# Patient Record
Sex: FEMALE | Race: WHITE | NOT HISPANIC OR LATINO | URBAN - METROPOLITAN AREA
[De-identification: names, ages, dates, MRNs, and addresses within clinical notes are randomized per-mention and may not be internally consistent; named-entity substitution may affect disease eponyms.]

---

## 2017-03-21 ENCOUNTER — EMERGENCY (EMERGENCY)
Facility: HOSPITAL | Age: 42
LOS: 1 days | Discharge: HOME | End: 2017-03-21
Attending: EMERGENCY MEDICINE | Admitting: EMERGENCY MEDICINE
Payer: COMMERCIAL

## 2017-03-21 VITALS
HEART RATE: 81 BPM | TEMPERATURE: 99 F | RESPIRATION RATE: 18 BRPM | WEIGHT: 231.93 LBS | DIASTOLIC BLOOD PRESSURE: 88 MMHG | OXYGEN SATURATION: 98 % | SYSTOLIC BLOOD PRESSURE: 153 MMHG

## 2017-03-21 DIAGNOSIS — J45.909 UNSPECIFIED ASTHMA, UNCOMPLICATED: ICD-10-CM

## 2017-03-21 DIAGNOSIS — O99.89 OTHER SPECIFIED DISEASES AND CONDITIONS COMPLICATING PREGNANCY, CHILDBIRTH AND THE PUERPERIUM: ICD-10-CM

## 2017-03-21 DIAGNOSIS — I10 ESSENTIAL (PRIMARY) HYPERTENSION: ICD-10-CM

## 2017-03-21 DIAGNOSIS — Z88.1 ALLERGY STATUS TO OTHER ANTIBIOTIC AGENTS STATUS: ICD-10-CM

## 2017-03-21 DIAGNOSIS — Z3A.01 LESS THAN 8 WEEKS GESTATION OF PREGNANCY: ICD-10-CM

## 2017-03-21 DIAGNOSIS — Z88.0 ALLERGY STATUS TO PENICILLIN: ICD-10-CM

## 2017-03-21 DIAGNOSIS — R07.89 OTHER CHEST PAIN: ICD-10-CM

## 2017-03-21 PROCEDURE — 99285 EMERGENCY DEPT VISIT HI MDM: CPT | Mod: 25

## 2017-03-21 PROCEDURE — 83880 ASSAY OF NATRIURETIC PEPTIDE: CPT

## 2017-03-21 PROCEDURE — 80053 COMPREHEN METABOLIC PANEL: CPT

## 2017-03-21 PROCEDURE — 36415 COLL VENOUS BLD VENIPUNCTURE: CPT

## 2017-03-21 PROCEDURE — 93010 ELECTROCARDIOGRAM REPORT: CPT

## 2017-03-21 PROCEDURE — 84484 ASSAY OF TROPONIN QUANT: CPT

## 2017-03-21 PROCEDURE — 93005 ELECTROCARDIOGRAM TRACING: CPT

## 2017-03-21 PROCEDURE — 99283 EMERGENCY DEPT VISIT LOW MDM: CPT | Mod: 25

## 2017-03-21 PROCEDURE — 85025 COMPLETE CBC W/AUTO DIFF WBC: CPT

## 2017-03-21 RX ORDER — ACETAMINOPHEN 500 MG
650 TABLET ORAL ONCE
Qty: 0 | Refills: 0 | Status: COMPLETED | OUTPATIENT
Start: 2017-03-21 | End: 2017-03-21

## 2017-03-21 RX ORDER — SODIUM CHLORIDE 9 MG/ML
3 INJECTION INTRAMUSCULAR; INTRAVENOUS; SUBCUTANEOUS ONCE
Qty: 0 | Refills: 0 | Status: COMPLETED | OUTPATIENT
Start: 2017-03-21 | End: 2017-03-21

## 2017-03-21 RX ADMIN — SODIUM CHLORIDE 3 MILLILITER(S): 9 INJECTION INTRAMUSCULAR; INTRAVENOUS; SUBCUTANEOUS at 20:34

## 2017-03-21 NOTE — ED ADULT TRIAGE NOTE - CHIEF COMPLAINT QUOTE
Patient c/o left chest pain since . Dyspnea on exertion for the past few days. 5-6 weeks pregnant. LMP 2/. . Sent from  to r/o PE.

## 2017-03-21 NOTE — ED PROVIDER NOTE - OBJECTIVE STATEMENT
42 yo woman with asthma and HTN presents with chest pain. This afternoon, while shopping, patient reported onset of 7/10 pressure/pain in lateral left chest/armpit with some mild radiation down the arm. The patient reports having similar pain in the past but never this severe. Usually accompanied by elevated BP and today it was 140/95. So patient went to Urgent care were EKG and HR were unremarkable but BP similar; so Urgent Care sent her to ED. Pt reports needing her rescue inhaler this AM for mild asthma symptoms, which did resolve. When pain started, pt reported some mild light-headedness (now resolved) but denies sweating, palpitations, SOB, nausea/vomiting. Pt reports mild nasal symptoms lately which she attributes to allergies; denies any recent illness. Pain is not changed by change in position. Pt has no additional pain on deep inspiration.

## 2017-03-21 NOTE — ED PROVIDER NOTE - FAMILY HISTORY
Father  Still living? No  Family history of cardiac disorder in father, Age at diagnosis: Age Unknown

## 2017-03-21 NOTE — ED PROVIDER NOTE - ATTENDING CONTRIBUTION TO CARE
41 F- had sharp pain under arm pit/upper outer chest while shopping- has had this pain multiple times in the past - constant pain- but much less  not worse with deep inspiration- no sob  this am- slight wheeze took inhaler- felt better  vss  s1s2 lungs cta bl  abd soft nt nd +bs  ext no c/c/e  IMP- CP  no sob  ekg, labs, IVF

## 2017-03-21 NOTE — ED ADULT NURSE NOTE - OBJECTIVE STATEMENT
pt received in room 7 a & o x 3 pt is about 6 weeks pregnant pt c/o mid sternal pain that started in the afternoon , pt denies any SOB , [pt denies any dizziness , no vaginal bleeding , IV was accessed labs sent will continue to monitor

## 2017-05-15 ENCOUNTER — APPOINTMENT (OUTPATIENT)
Dept: HUMAN REPRODUCTION | Facility: CLINIC | Age: 42
End: 2017-05-15

## 2017-05-15 DIAGNOSIS — N97.9 FEMALE INFERTILITY, UNSPECIFIED: ICD-10-CM

## 2017-06-07 ENCOUNTER — APPOINTMENT (OUTPATIENT)
Dept: HUMAN REPRODUCTION | Facility: CLINIC | Age: 42
End: 2017-06-07

## 2017-06-13 ENCOUNTER — APPOINTMENT (OUTPATIENT)
Dept: OBGYN | Facility: CLINIC | Age: 42
End: 2017-06-13

## 2017-06-18 ENCOUNTER — APPOINTMENT (OUTPATIENT)
Dept: HUMAN REPRODUCTION | Facility: CLINIC | Age: 42
End: 2017-06-18

## 2017-07-03 ENCOUNTER — APPOINTMENT (OUTPATIENT)
Dept: HUMAN REPRODUCTION | Facility: CLINIC | Age: 42
End: 2017-07-03

## 2017-07-06 ENCOUNTER — APPOINTMENT (OUTPATIENT)
Dept: HUMAN REPRODUCTION | Facility: CLINIC | Age: 42
End: 2017-07-06

## 2017-07-10 ENCOUNTER — APPOINTMENT (OUTPATIENT)
Dept: HUMAN REPRODUCTION | Facility: CLINIC | Age: 42
End: 2017-07-10

## 2017-07-12 ENCOUNTER — APPOINTMENT (OUTPATIENT)
Dept: HUMAN REPRODUCTION | Facility: CLINIC | Age: 42
End: 2017-07-12

## 2017-07-14 ENCOUNTER — APPOINTMENT (OUTPATIENT)
Dept: HUMAN REPRODUCTION | Facility: CLINIC | Age: 42
End: 2017-07-14

## 2017-07-16 ENCOUNTER — APPOINTMENT (OUTPATIENT)
Dept: HUMAN REPRODUCTION | Facility: CLINIC | Age: 42
End: 2017-07-16

## 2017-07-17 ENCOUNTER — APPOINTMENT (OUTPATIENT)
Dept: HUMAN REPRODUCTION | Facility: CLINIC | Age: 42
End: 2017-07-17

## 2017-07-18 ENCOUNTER — APPOINTMENT (OUTPATIENT)
Dept: HUMAN REPRODUCTION | Facility: CLINIC | Age: 42
End: 2017-07-18

## 2017-07-19 ENCOUNTER — APPOINTMENT (OUTPATIENT)
Dept: HUMAN REPRODUCTION | Facility: CLINIC | Age: 42
End: 2017-07-19

## 2017-07-20 ENCOUNTER — APPOINTMENT (OUTPATIENT)
Dept: HUMAN REPRODUCTION | Facility: CLINIC | Age: 42
End: 2017-07-20

## 2017-07-21 ENCOUNTER — APPOINTMENT (OUTPATIENT)
Dept: HUMAN REPRODUCTION | Facility: CLINIC | Age: 42
End: 2017-07-21

## 2017-07-25 ENCOUNTER — APPOINTMENT (OUTPATIENT)
Dept: HUMAN REPRODUCTION | Facility: CLINIC | Age: 42
End: 2017-07-25

## 2017-08-02 ENCOUNTER — APPOINTMENT (OUTPATIENT)
Dept: HUMAN REPRODUCTION | Facility: CLINIC | Age: 42
End: 2017-08-02
Payer: COMMERCIAL

## 2017-08-02 PROCEDURE — 76830 TRANSVAGINAL US NON-OB: CPT

## 2017-08-02 PROCEDURE — 36415 COLL VENOUS BLD VENIPUNCTURE: CPT

## 2017-08-07 ENCOUNTER — APPOINTMENT (OUTPATIENT)
Dept: HUMAN REPRODUCTION | Facility: CLINIC | Age: 42
End: 2017-08-07

## 2017-08-14 ENCOUNTER — APPOINTMENT (OUTPATIENT)
Dept: HUMAN REPRODUCTION | Facility: CLINIC | Age: 42
End: 2017-08-14
Payer: COMMERCIAL

## 2017-08-14 PROCEDURE — 99214 OFFICE O/P EST MOD 30 MIN: CPT

## 2017-09-06 ENCOUNTER — APPOINTMENT (OUTPATIENT)
Dept: HUMAN REPRODUCTION | Facility: CLINIC | Age: 42
End: 2017-09-06
Payer: COMMERCIAL

## 2017-09-06 PROCEDURE — 36415 COLL VENOUS BLD VENIPUNCTURE: CPT

## 2017-09-06 PROCEDURE — 99211 OFF/OP EST MAY X REQ PHY/QHP: CPT | Mod: 25

## 2017-09-06 PROCEDURE — 76830 TRANSVAGINAL US NON-OB: CPT

## 2017-09-13 ENCOUNTER — APPOINTMENT (OUTPATIENT)
Dept: HUMAN REPRODUCTION | Facility: CLINIC | Age: 42
End: 2017-09-13

## 2017-09-14 ENCOUNTER — APPOINTMENT (OUTPATIENT)
Dept: HUMAN REPRODUCTION | Facility: CLINIC | Age: 42
End: 2017-09-14
Payer: COMMERCIAL

## 2017-09-14 PROCEDURE — 76830 TRANSVAGINAL US NON-OB: CPT

## 2017-09-18 ENCOUNTER — APPOINTMENT (OUTPATIENT)
Dept: HUMAN REPRODUCTION | Facility: CLINIC | Age: 42
End: 2017-09-18
Payer: COMMERCIAL

## 2017-09-18 PROCEDURE — 36415 COLL VENOUS BLD VENIPUNCTURE: CPT

## 2017-09-18 PROCEDURE — 76830 TRANSVAGINAL US NON-OB: CPT

## 2017-09-20 ENCOUNTER — APPOINTMENT (OUTPATIENT)
Dept: HUMAN REPRODUCTION | Facility: CLINIC | Age: 42
End: 2017-09-20
Payer: COMMERCIAL

## 2017-09-20 PROCEDURE — 36415 COLL VENOUS BLD VENIPUNCTURE: CPT

## 2017-09-20 PROCEDURE — 76830 TRANSVAGINAL US NON-OB: CPT

## 2017-09-21 ENCOUNTER — APPOINTMENT (OUTPATIENT)
Dept: HUMAN REPRODUCTION | Facility: CLINIC | Age: 42
End: 2017-09-21

## 2017-09-22 ENCOUNTER — APPOINTMENT (OUTPATIENT)
Dept: HUMAN REPRODUCTION | Facility: CLINIC | Age: 42
End: 2017-09-22
Payer: COMMERCIAL

## 2017-09-22 PROCEDURE — 76830 TRANSVAGINAL US NON-OB: CPT

## 2017-09-22 PROCEDURE — 36415 COLL VENOUS BLD VENIPUNCTURE: CPT

## 2017-09-25 ENCOUNTER — APPOINTMENT (OUTPATIENT)
Dept: HUMAN REPRODUCTION | Facility: CLINIC | Age: 42
End: 2017-09-25
Payer: COMMERCIAL

## 2017-09-25 PROCEDURE — 36415 COLL VENOUS BLD VENIPUNCTURE: CPT

## 2017-09-25 PROCEDURE — 76830 TRANSVAGINAL US NON-OB: CPT

## 2017-09-27 ENCOUNTER — APPOINTMENT (OUTPATIENT)
Dept: HUMAN REPRODUCTION | Facility: CLINIC | Age: 42
End: 2017-09-27
Payer: COMMERCIAL

## 2017-09-27 PROCEDURE — 76830 TRANSVAGINAL US NON-OB: CPT

## 2017-09-27 PROCEDURE — 36415 COLL VENOUS BLD VENIPUNCTURE: CPT

## 2017-09-29 ENCOUNTER — APPOINTMENT (OUTPATIENT)
Dept: HUMAN REPRODUCTION | Facility: CLINIC | Age: 42
End: 2017-09-29
Payer: COMMERCIAL

## 2017-09-29 PROCEDURE — 36415 COLL VENOUS BLD VENIPUNCTURE: CPT

## 2017-09-29 PROCEDURE — 76830 TRANSVAGINAL US NON-OB: CPT

## 2017-09-30 ENCOUNTER — APPOINTMENT (OUTPATIENT)
Dept: HUMAN REPRODUCTION | Facility: CLINIC | Age: 42
End: 2017-09-30
Payer: COMMERCIAL

## 2017-09-30 PROCEDURE — 76830 TRANSVAGINAL US NON-OB: CPT

## 2017-09-30 PROCEDURE — 99213 OFFICE O/P EST LOW 20 MIN: CPT | Mod: 25

## 2017-09-30 PROCEDURE — 36415 COLL VENOUS BLD VENIPUNCTURE: CPT

## 2017-10-01 ENCOUNTER — APPOINTMENT (OUTPATIENT)
Dept: HUMAN REPRODUCTION | Facility: CLINIC | Age: 42
End: 2017-10-01
Payer: COMMERCIAL

## 2017-10-01 PROCEDURE — 36415 COLL VENOUS BLD VENIPUNCTURE: CPT

## 2017-10-01 PROCEDURE — 76830 TRANSVAGINAL US NON-OB: CPT

## 2017-10-02 ENCOUNTER — APPOINTMENT (OUTPATIENT)
Dept: HUMAN REPRODUCTION | Facility: CLINIC | Age: 42
End: 2017-10-02
Payer: COMMERCIAL

## 2017-10-02 PROCEDURE — 76830 TRANSVAGINAL US NON-OB: CPT

## 2017-10-02 PROCEDURE — 36415 COLL VENOUS BLD VENIPUNCTURE: CPT

## 2017-10-03 ENCOUNTER — APPOINTMENT (OUTPATIENT)
Dept: HUMAN REPRODUCTION | Facility: CLINIC | Age: 42
End: 2017-10-03
Payer: COMMERCIAL

## 2017-10-03 PROCEDURE — 76948 ECHO GUIDE OVA ASPIRATION: CPT

## 2017-10-03 PROCEDURE — 89280 ASSIST OOCYTE FERTILIZATION: CPT

## 2017-10-03 PROCEDURE — 58970 RETRIEVAL OF OOCYTE: CPT

## 2017-10-03 PROCEDURE — 89254 OOCYTE IDENTIFICATION: CPT

## 2017-10-03 PROCEDURE — 89250 CULTR OOCYTE/EMBRYO <4 DAYS: CPT

## 2017-10-06 ENCOUNTER — APPOINTMENT (OUTPATIENT)
Dept: HUMAN REPRODUCTION | Facility: CLINIC | Age: 42
End: 2017-10-06
Payer: COMMERCIAL

## 2017-10-06 PROCEDURE — 89253 EMBRYO HATCHING: CPT

## 2017-10-06 PROCEDURE — 76830 TRANSVAGINAL US NON-OB: CPT

## 2017-10-06 PROCEDURE — 36415 COLL VENOUS BLD VENIPUNCTURE: CPT

## 2017-10-07 PROCEDURE — 89272 EXTENDED CULTURE OF OOCYTES: CPT

## 2017-10-09 PROCEDURE — 89258 CRYOPRESERVATION EMBRYO(S): CPT

## 2017-10-09 PROCEDURE — 89342 STORAGE/YEAR EMBRYO(S): CPT

## 2017-10-09 PROCEDURE — 89290 BIOPSY OOCYTE POLAR BODY <=5: CPT

## 2017-10-16 ENCOUNTER — APPOINTMENT (OUTPATIENT)
Dept: HUMAN REPRODUCTION | Facility: CLINIC | Age: 42
End: 2017-10-16

## 2017-10-16 ENCOUNTER — APPOINTMENT (OUTPATIENT)
Dept: HUMAN REPRODUCTION | Facility: CLINIC | Age: 42
End: 2017-10-16
Payer: COMMERCIAL

## 2017-10-16 PROCEDURE — 99213 OFFICE O/P EST LOW 20 MIN: CPT | Mod: 25

## 2017-10-16 PROCEDURE — 36415 COLL VENOUS BLD VENIPUNCTURE: CPT

## 2017-10-16 PROCEDURE — 76830 TRANSVAGINAL US NON-OB: CPT

## 2017-10-19 ENCOUNTER — APPOINTMENT (OUTPATIENT)
Dept: HUMAN REPRODUCTION | Facility: CLINIC | Age: 42
End: 2017-10-19

## 2017-10-30 ENCOUNTER — APPOINTMENT (OUTPATIENT)
Dept: HUMAN REPRODUCTION | Facility: CLINIC | Age: 42
End: 2017-10-30

## 2017-11-30 ENCOUNTER — APPOINTMENT (OUTPATIENT)
Dept: HUMAN REPRODUCTION | Facility: CLINIC | Age: 42
End: 2017-11-30
Payer: COMMERCIAL

## 2017-11-30 PROCEDURE — 99215 OFFICE O/P EST HI 40 MIN: CPT | Mod: 25

## 2017-12-22 ENCOUNTER — APPOINTMENT (OUTPATIENT)
Dept: HUMAN REPRODUCTION | Facility: CLINIC | Age: 42
End: 2017-12-22
Payer: COMMERCIAL

## 2017-12-22 PROCEDURE — 36415 COLL VENOUS BLD VENIPUNCTURE: CPT

## 2017-12-22 PROCEDURE — 76830 TRANSVAGINAL US NON-OB: CPT

## 2017-12-26 ENCOUNTER — APPOINTMENT (OUTPATIENT)
Dept: HUMAN REPRODUCTION | Facility: CLINIC | Age: 42
End: 2017-12-26
Payer: COMMERCIAL

## 2017-12-26 PROCEDURE — 76830 TRANSVAGINAL US NON-OB: CPT

## 2017-12-26 PROCEDURE — 36415 COLL VENOUS BLD VENIPUNCTURE: CPT

## 2017-12-28 ENCOUNTER — APPOINTMENT (OUTPATIENT)
Dept: HUMAN REPRODUCTION | Facility: CLINIC | Age: 42
End: 2017-12-28
Payer: COMMERCIAL

## 2017-12-29 ENCOUNTER — APPOINTMENT (OUTPATIENT)
Dept: HUMAN REPRODUCTION | Facility: CLINIC | Age: 42
End: 2017-12-29
Payer: COMMERCIAL

## 2017-12-29 ENCOUNTER — APPOINTMENT (OUTPATIENT)
Dept: HUMAN REPRODUCTION | Facility: CLINIC | Age: 42
End: 2017-12-29

## 2017-12-29 PROCEDURE — 36415 COLL VENOUS BLD VENIPUNCTURE: CPT

## 2017-12-29 PROCEDURE — 76830 TRANSVAGINAL US NON-OB: CPT

## 2018-01-02 ENCOUNTER — APPOINTMENT (OUTPATIENT)
Dept: HUMAN REPRODUCTION | Facility: CLINIC | Age: 43
End: 2018-01-02
Payer: COMMERCIAL

## 2018-01-02 PROCEDURE — 76830 TRANSVAGINAL US NON-OB: CPT

## 2018-01-02 PROCEDURE — 36415 COLL VENOUS BLD VENIPUNCTURE: CPT

## 2018-01-05 ENCOUNTER — APPOINTMENT (OUTPATIENT)
Dept: HUMAN REPRODUCTION | Facility: CLINIC | Age: 43
End: 2018-01-05
Payer: COMMERCIAL

## 2018-01-05 PROCEDURE — 76830 TRANSVAGINAL US NON-OB: CPT

## 2018-01-05 PROCEDURE — 36415 COLL VENOUS BLD VENIPUNCTURE: CPT

## 2018-01-08 ENCOUNTER — APPOINTMENT (OUTPATIENT)
Dept: HUMAN REPRODUCTION | Facility: CLINIC | Age: 43
End: 2018-01-08
Payer: COMMERCIAL

## 2018-01-08 PROCEDURE — 36415 COLL VENOUS BLD VENIPUNCTURE: CPT

## 2018-01-08 PROCEDURE — 76830 TRANSVAGINAL US NON-OB: CPT

## 2018-01-11 ENCOUNTER — APPOINTMENT (OUTPATIENT)
Dept: HUMAN REPRODUCTION | Facility: CLINIC | Age: 43
End: 2018-01-11
Payer: COMMERCIAL

## 2018-01-11 PROCEDURE — 76830 TRANSVAGINAL US NON-OB: CPT

## 2018-01-11 PROCEDURE — 36415 COLL VENOUS BLD VENIPUNCTURE: CPT

## 2018-01-12 ENCOUNTER — APPOINTMENT (OUTPATIENT)
Dept: HUMAN REPRODUCTION | Facility: CLINIC | Age: 43
End: 2018-01-12
Payer: COMMERCIAL

## 2018-01-12 PROCEDURE — 76830 TRANSVAGINAL US NON-OB: CPT

## 2018-01-12 PROCEDURE — 36415 COLL VENOUS BLD VENIPUNCTURE: CPT

## 2018-01-13 ENCOUNTER — APPOINTMENT (OUTPATIENT)
Dept: HUMAN REPRODUCTION | Facility: CLINIC | Age: 43
End: 2018-01-13
Payer: COMMERCIAL

## 2018-01-13 PROCEDURE — 76830 TRANSVAGINAL US NON-OB: CPT

## 2018-01-13 PROCEDURE — 99213 OFFICE O/P EST LOW 20 MIN: CPT | Mod: 25

## 2018-01-13 PROCEDURE — 36415 COLL VENOUS BLD VENIPUNCTURE: CPT

## 2018-01-14 ENCOUNTER — APPOINTMENT (OUTPATIENT)
Dept: HUMAN REPRODUCTION | Facility: CLINIC | Age: 43
End: 2018-01-14
Payer: COMMERCIAL

## 2018-01-14 PROCEDURE — 36415 COLL VENOUS BLD VENIPUNCTURE: CPT

## 2018-01-14 PROCEDURE — 76830 TRANSVAGINAL US NON-OB: CPT

## 2018-01-15 ENCOUNTER — APPOINTMENT (OUTPATIENT)
Dept: HUMAN REPRODUCTION | Facility: CLINIC | Age: 43
End: 2018-01-15
Payer: COMMERCIAL

## 2018-01-15 PROCEDURE — 36415 COLL VENOUS BLD VENIPUNCTURE: CPT

## 2018-01-15 PROCEDURE — 99213 OFFICE O/P EST LOW 20 MIN: CPT | Mod: 25

## 2018-01-15 PROCEDURE — 76830 TRANSVAGINAL US NON-OB: CPT

## 2018-01-16 ENCOUNTER — APPOINTMENT (OUTPATIENT)
Dept: HUMAN REPRODUCTION | Facility: CLINIC | Age: 43
End: 2018-01-16
Payer: COMMERCIAL

## 2018-01-16 PROCEDURE — 89250 CULTR OOCYTE/EMBRYO <4 DAYS: CPT

## 2018-01-16 PROCEDURE — 89342 STORAGE/YEAR EMBRYO(S): CPT

## 2018-01-16 PROCEDURE — 89254 OOCYTE IDENTIFICATION: CPT

## 2018-01-16 PROCEDURE — 89280 ASSIST OOCYTE FERTILIZATION: CPT

## 2018-01-16 PROCEDURE — 58970 RETRIEVAL OF OOCYTE: CPT

## 2018-01-16 PROCEDURE — 89290 BIOPSY OOCYTE POLAR BODY <=5: CPT

## 2018-01-16 PROCEDURE — 76948 ECHO GUIDE OVA ASPIRATION: CPT

## 2018-01-19 ENCOUNTER — APPOINTMENT (OUTPATIENT)
Dept: HUMAN REPRODUCTION | Facility: CLINIC | Age: 43
End: 2018-01-19

## 2018-01-19 PROCEDURE — 89253 EMBRYO HATCHING: CPT

## 2018-01-20 PROCEDURE — 89272 EXTENDED CULTURE OF OOCYTES: CPT

## 2018-01-21 ENCOUNTER — APPOINTMENT (OUTPATIENT)
Dept: HUMAN REPRODUCTION | Facility: CLINIC | Age: 43
End: 2018-01-21

## 2018-01-22 PROCEDURE — 89258 CRYOPRESERVATION EMBRYO(S): CPT

## 2018-01-23 ENCOUNTER — APPOINTMENT (OUTPATIENT)
Dept: HUMAN REPRODUCTION | Facility: CLINIC | Age: 43
End: 2018-01-23
Payer: COMMERCIAL

## 2018-01-23 PROCEDURE — 99024 POSTOP FOLLOW-UP VISIT: CPT

## 2018-03-19 ENCOUNTER — APPOINTMENT (OUTPATIENT)
Dept: HUMAN REPRODUCTION | Facility: CLINIC | Age: 43
End: 2018-03-19
Payer: COMMERCIAL

## 2018-03-19 PROCEDURE — 76830 TRANSVAGINAL US NON-OB: CPT

## 2018-03-19 PROCEDURE — 36415 COLL VENOUS BLD VENIPUNCTURE: CPT

## 2018-03-19 PROCEDURE — 99213 OFFICE O/P EST LOW 20 MIN: CPT | Mod: 25

## 2018-04-06 NOTE — ED PROVIDER NOTE - PRIOR EKG STATUS
Abdomen soft, +RLQ tenderness, no rebound or guarding, +bs
there is no prior EKG available for comparison

## 2018-04-09 ENCOUNTER — APPOINTMENT (OUTPATIENT)
Dept: HUMAN REPRODUCTION | Facility: CLINIC | Age: 43
End: 2018-04-09
Payer: COMMERCIAL

## 2018-04-09 PROCEDURE — 36415 COLL VENOUS BLD VENIPUNCTURE: CPT

## 2018-04-09 PROCEDURE — 99213 OFFICE O/P EST LOW 20 MIN: CPT | Mod: 25

## 2018-04-09 PROCEDURE — 76830 TRANSVAGINAL US NON-OB: CPT

## 2018-04-20 ENCOUNTER — APPOINTMENT (OUTPATIENT)
Dept: HUMAN REPRODUCTION | Facility: CLINIC | Age: 43
End: 2018-04-20
Payer: COMMERCIAL

## 2018-04-20 PROCEDURE — 99214 OFFICE O/P EST MOD 30 MIN: CPT | Mod: 25

## 2018-04-20 PROCEDURE — 36415 COLL VENOUS BLD VENIPUNCTURE: CPT

## 2018-04-20 PROCEDURE — 76830 TRANSVAGINAL US NON-OB: CPT

## 2018-04-24 ENCOUNTER — APPOINTMENT (OUTPATIENT)
Dept: HUMAN REPRODUCTION | Facility: CLINIC | Age: 43
End: 2018-04-24
Payer: COMMERCIAL

## 2018-04-24 PROCEDURE — 36415 COLL VENOUS BLD VENIPUNCTURE: CPT

## 2018-04-24 PROCEDURE — 76830 TRANSVAGINAL US NON-OB: CPT

## 2018-04-27 ENCOUNTER — APPOINTMENT (OUTPATIENT)
Dept: HUMAN REPRODUCTION | Facility: CLINIC | Age: 43
End: 2018-04-27
Payer: COMMERCIAL

## 2018-04-27 PROCEDURE — 76830 TRANSVAGINAL US NON-OB: CPT

## 2018-04-27 PROCEDURE — 36415 COLL VENOUS BLD VENIPUNCTURE: CPT

## 2018-04-27 PROCEDURE — 99213 OFFICE O/P EST LOW 20 MIN: CPT | Mod: 25

## 2018-04-30 ENCOUNTER — APPOINTMENT (OUTPATIENT)
Dept: HUMAN REPRODUCTION | Facility: CLINIC | Age: 43
End: 2018-04-30
Payer: COMMERCIAL

## 2018-04-30 PROCEDURE — 36415 COLL VENOUS BLD VENIPUNCTURE: CPT

## 2018-04-30 PROCEDURE — 76830 TRANSVAGINAL US NON-OB: CPT

## 2018-04-30 PROCEDURE — 99212 OFFICE O/P EST SF 10 MIN: CPT | Mod: 25

## 2018-05-02 ENCOUNTER — APPOINTMENT (OUTPATIENT)
Dept: HUMAN REPRODUCTION | Facility: CLINIC | Age: 43
End: 2018-05-02
Payer: COMMERCIAL

## 2018-05-02 PROCEDURE — 36415 COLL VENOUS BLD VENIPUNCTURE: CPT

## 2018-05-02 PROCEDURE — 76830 TRANSVAGINAL US NON-OB: CPT

## 2018-05-03 ENCOUNTER — APPOINTMENT (OUTPATIENT)
Dept: HUMAN REPRODUCTION | Facility: CLINIC | Age: 43
End: 2018-05-03
Payer: COMMERCIAL

## 2018-05-03 PROCEDURE — 36415 COLL VENOUS BLD VENIPUNCTURE: CPT

## 2018-05-03 PROCEDURE — 76830 TRANSVAGINAL US NON-OB: CPT

## 2018-05-03 PROCEDURE — 99212 OFFICE O/P EST SF 10 MIN: CPT | Mod: 25

## 2018-05-04 ENCOUNTER — APPOINTMENT (OUTPATIENT)
Dept: HUMAN REPRODUCTION | Facility: CLINIC | Age: 43
End: 2018-05-04
Payer: COMMERCIAL

## 2018-05-04 PROCEDURE — 36415 COLL VENOUS BLD VENIPUNCTURE: CPT

## 2018-05-04 PROCEDURE — 76830 TRANSVAGINAL US NON-OB: CPT

## 2018-05-05 ENCOUNTER — APPOINTMENT (OUTPATIENT)
Dept: HUMAN REPRODUCTION | Facility: CLINIC | Age: 43
End: 2018-05-05
Payer: COMMERCIAL

## 2018-05-05 PROCEDURE — 36415 COLL VENOUS BLD VENIPUNCTURE: CPT

## 2018-05-05 PROCEDURE — 76830 TRANSVAGINAL US NON-OB: CPT

## 2018-05-05 PROCEDURE — 99213 OFFICE O/P EST LOW 20 MIN: CPT | Mod: 25

## 2018-05-06 ENCOUNTER — APPOINTMENT (OUTPATIENT)
Dept: HUMAN REPRODUCTION | Facility: CLINIC | Age: 43
End: 2018-05-06
Payer: COMMERCIAL

## 2018-05-06 PROCEDURE — 89280 ASSIST OOCYTE FERTILIZATION: CPT

## 2018-05-06 PROCEDURE — 89250 CULTR OOCYTE/EMBRYO <4 DAYS: CPT

## 2018-05-06 PROCEDURE — 58970 RETRIEVAL OF OOCYTE: CPT

## 2018-05-06 PROCEDURE — 89254 OOCYTE IDENTIFICATION: CPT

## 2018-05-06 PROCEDURE — 58999 UNLISTED PX FML GENITAL SYS: CPT

## 2018-05-06 PROCEDURE — 76948 ECHO GUIDE OVA ASPIRATION: CPT

## 2018-05-09 ENCOUNTER — APPOINTMENT (OUTPATIENT)
Dept: HUMAN REPRODUCTION | Facility: CLINIC | Age: 43
End: 2018-05-09
Payer: COMMERCIAL

## 2018-05-09 PROCEDURE — 89255 PREPARE EMBRYO FOR TRANSFER: CPT

## 2018-05-09 PROCEDURE — 89253 EMBRYO HATCHING: CPT

## 2018-05-09 PROCEDURE — 58974 EMBRYO TRANSFER INTRAUTERINE: CPT

## 2018-05-09 PROCEDURE — 76942 ECHO GUIDE FOR BIOPSY: CPT

## 2018-05-10 PROCEDURE — 89272 EXTENDED CULTURE OF OOCYTES: CPT

## 2018-07-24 ENCOUNTER — EMERGENCY (EMERGENCY)
Facility: HOSPITAL | Age: 43
LOS: 1 days | Discharge: HOME | End: 2018-07-24
Admitting: EMERGENCY MEDICINE
Payer: COMMERCIAL

## 2018-07-24 VITALS
OXYGEN SATURATION: 97 % | DIASTOLIC BLOOD PRESSURE: 86 MMHG | TEMPERATURE: 98 F | RESPIRATION RATE: 18 BRPM | WEIGHT: 231.93 LBS | SYSTOLIC BLOOD PRESSURE: 152 MMHG | HEART RATE: 70 BPM

## 2018-07-24 PROBLEM — J45.909 UNSPECIFIED ASTHMA, UNCOMPLICATED: Chronic | Status: ACTIVE | Noted: 2017-03-21

## 2018-07-24 PROBLEM — I10 ESSENTIAL (PRIMARY) HYPERTENSION: Chronic | Status: ACTIVE | Noted: 2017-03-21

## 2018-07-24 PROCEDURE — 73630 X-RAY EXAM OF FOOT: CPT | Mod: 26,RT

## 2018-07-24 PROCEDURE — 99283 EMERGENCY DEPT VISIT LOW MDM: CPT

## 2018-07-24 PROCEDURE — 99283 EMERGENCY DEPT VISIT LOW MDM: CPT | Mod: 25

## 2018-07-24 PROCEDURE — 73630 X-RAY EXAM OF FOOT: CPT

## 2018-07-24 NOTE — ED PROVIDER NOTE - OBJECTIVE STATEMENT
43 yo F with pmh of asthma and HTN c/o R foot pain after she dropped a 45lb weight on her foot while working out. Pt was wearing sneakers at the time. Pt took motrin and iced it but is going away and wanted to make sure it wasn't broken. Pt is able to bear weight. No numbness, tingling. +swelling 43 yo F with pmh of asthma and HTN, hx of R bunionectomy c/o R foot pain after she dropped a 45lb weight on her foot while working out. Pt was wearing sneakers at the time. Pt took motrin and iced it but is going away and wanted to make sure it wasn't broken. Pt is able to bear weight. No numbness, tingling. +swelling

## 2018-07-24 NOTE — ED PROVIDER NOTE - DIAGNOSTIC INTERPRETATION
ER PA: Latoya alcantar xray INTERPRETATION:  no acute fracture; no soft tissue swelling noted; old bony deformity from bunionectomy

## 2018-07-24 NOTE — ED PROVIDER NOTE - MEDICAL DECISION MAKING DETAILS
41 yo F with pmh of asthma and HTN c/o R foot pain after she dropped a 45lb weight on her foot while working out. 41 yo F with pmh of asthma and HTN c/o R foot pain after she dropped a 45lb weight on her foot while working out. Rfoot- +mild erythema/tenderness to mid 1st metacarpal, minimal swelling, pulses, sensation intact 43 yo F with pmh of asthma and HTN c/o R foot pain after she dropped a 45lb weight on her foot while working out. Rfoot- +mild erythema/tenderness to mid 1st metacarpal, minimal swelling, pulses, sensation intact. Xray with no fracture (discussed with radiology, old bunionectomy).

## 2018-07-24 NOTE — ED PROVIDER NOTE - PHYSICAL EXAMINATION
CONSTITUTIONAL: Well-appearing; well-nourished; in no apparent distress.   HEAD: Normocephalic; atraumatic.   NECK: Supple; non-tender;   CARDIOVASCULAR: Normal S1, S2; no murmurs, rubs, or gallops. Regular rate and rhythm.   RESPIRATORY: Breathing easily; breath sounds clear and equal bilaterally; no wheezes, rhonchi, or rales.  MSK: Rfoot- +mild erythema/tenderness to mid 1st metacarpal, minimal swelling, pulses, sensation intact  EXT: No cyanosis or edema; N/V intact  SKIN: Normal for age and race; warm; dry; good turgor; no apparent lesions or rash.

## 2018-07-28 DIAGNOSIS — I10 ESSENTIAL (PRIMARY) HYPERTENSION: ICD-10-CM
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
## 2018-07-28 DIAGNOSIS — S90.31XA CONTUSION OF RIGHT FOOT, INITIAL ENCOUNTER: ICD-10-CM
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
## 2018-07-28 DIAGNOSIS — Z79.899 OTHER LONG TERM (CURRENT) DRUG THERAPY: ICD-10-CM
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
## 2018-07-28 DIAGNOSIS — Z88.0 ALLERGY STATUS TO PENICILLIN: ICD-10-CM

## 2018-07-28 DIAGNOSIS — Z88.1 ALLERGY STATUS TO OTHER ANTIBIOTIC AGENTS STATUS: ICD-10-CM
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
## 2018-07-28 DIAGNOSIS — J45.909 UNSPECIFIED ASTHMA, UNCOMPLICATED: ICD-10-CM

## 2018-07-28 DIAGNOSIS — M79.671 PAIN IN RIGHT FOOT: ICD-10-CM

## 2018-09-11 ENCOUNTER — APPOINTMENT (OUTPATIENT)
Dept: HUMAN REPRODUCTION | Facility: CLINIC | Age: 43
End: 2018-09-11
Payer: COMMERCIAL

## 2018-09-11 PROCEDURE — 36415 COLL VENOUS BLD VENIPUNCTURE: CPT

## 2018-09-11 PROCEDURE — 99211 OFF/OP EST MAY X REQ PHY/QHP: CPT | Mod: 25

## 2018-09-11 PROCEDURE — 76830 TRANSVAGINAL US NON-OB: CPT

## 2018-09-23 ENCOUNTER — APPOINTMENT (OUTPATIENT)
Dept: HUMAN REPRODUCTION | Facility: CLINIC | Age: 43
End: 2018-09-23
Payer: COMMERCIAL

## 2018-09-23 ENCOUNTER — RESULT REVIEW (OUTPATIENT)
Age: 43
End: 2018-09-23

## 2018-09-23 PROCEDURE — 99213 OFFICE O/P EST LOW 20 MIN: CPT | Mod: 25

## 2018-09-23 PROCEDURE — 76830 TRANSVAGINAL US NON-OB: CPT

## 2018-09-23 PROCEDURE — 36415 COLL VENOUS BLD VENIPUNCTURE: CPT

## 2018-09-27 ENCOUNTER — APPOINTMENT (OUTPATIENT)
Dept: HUMAN REPRODUCTION | Facility: CLINIC | Age: 43
End: 2018-09-27
Payer: COMMERCIAL

## 2018-09-27 PROCEDURE — 76830 TRANSVAGINAL US NON-OB: CPT

## 2018-09-27 PROCEDURE — 36415 COLL VENOUS BLD VENIPUNCTURE: CPT

## 2018-09-27 PROCEDURE — 99213 OFFICE O/P EST LOW 20 MIN: CPT | Mod: 25

## 2018-10-01 ENCOUNTER — APPOINTMENT (OUTPATIENT)
Dept: HUMAN REPRODUCTION | Facility: CLINIC | Age: 43
End: 2018-10-01
Payer: COMMERCIAL

## 2018-10-01 ENCOUNTER — RESULT REVIEW (OUTPATIENT)
Age: 43
End: 2018-10-01

## 2018-10-01 PROCEDURE — 99213 OFFICE O/P EST LOW 20 MIN: CPT | Mod: 25

## 2018-10-01 PROCEDURE — 76830 TRANSVAGINAL US NON-OB: CPT

## 2018-10-01 PROCEDURE — 36415 COLL VENOUS BLD VENIPUNCTURE: CPT

## 2018-10-04 ENCOUNTER — APPOINTMENT (OUTPATIENT)
Dept: HUMAN REPRODUCTION | Facility: CLINIC | Age: 43
End: 2018-10-04
Payer: COMMERCIAL

## 2018-10-04 PROCEDURE — 99213 OFFICE O/P EST LOW 20 MIN: CPT | Mod: 25

## 2018-10-04 PROCEDURE — 76830 TRANSVAGINAL US NON-OB: CPT

## 2018-10-04 PROCEDURE — 36415 COLL VENOUS BLD VENIPUNCTURE: CPT

## 2018-10-07 ENCOUNTER — APPOINTMENT (OUTPATIENT)
Dept: HUMAN REPRODUCTION | Facility: CLINIC | Age: 43
End: 2018-10-07
Payer: COMMERCIAL

## 2018-10-07 PROCEDURE — 36415 COLL VENOUS BLD VENIPUNCTURE: CPT

## 2018-10-07 PROCEDURE — 76830 TRANSVAGINAL US NON-OB: CPT

## 2018-10-07 PROCEDURE — 99213 OFFICE O/P EST LOW 20 MIN: CPT | Mod: 25

## 2018-10-08 ENCOUNTER — APPOINTMENT (OUTPATIENT)
Dept: HUMAN REPRODUCTION | Facility: CLINIC | Age: 43
End: 2018-10-08
Payer: COMMERCIAL

## 2018-10-08 PROCEDURE — 76830 TRANSVAGINAL US NON-OB: CPT

## 2018-10-08 PROCEDURE — 36415 COLL VENOUS BLD VENIPUNCTURE: CPT

## 2018-10-08 PROCEDURE — 99213 OFFICE O/P EST LOW 20 MIN: CPT | Mod: 25

## 2018-10-09 ENCOUNTER — CLINICAL ADVICE (OUTPATIENT)
Age: 43
End: 2018-10-09

## 2018-10-09 ENCOUNTER — APPOINTMENT (OUTPATIENT)
Dept: HUMAN REPRODUCTION | Facility: CLINIC | Age: 43
End: 2018-10-09
Payer: COMMERCIAL

## 2018-10-09 PROCEDURE — 76830 TRANSVAGINAL US NON-OB: CPT

## 2018-10-09 PROCEDURE — 99213 OFFICE O/P EST LOW 20 MIN: CPT | Mod: 25

## 2018-10-09 PROCEDURE — 36415 COLL VENOUS BLD VENIPUNCTURE: CPT

## 2018-10-10 ENCOUNTER — APPOINTMENT (OUTPATIENT)
Dept: HUMAN REPRODUCTION | Facility: CLINIC | Age: 43
End: 2018-10-10
Payer: COMMERCIAL

## 2018-10-10 PROCEDURE — 89254 OOCYTE IDENTIFICATION: CPT

## 2018-10-10 PROCEDURE — 76830 TRANSVAGINAL US NON-OB: CPT

## 2018-10-10 PROCEDURE — 89250 CULTR OOCYTE/EMBRYO <4 DAYS: CPT

## 2018-10-10 PROCEDURE — 89280 ASSIST OOCYTE FERTILIZATION: CPT

## 2018-10-10 PROCEDURE — 99213 OFFICE O/P EST LOW 20 MIN: CPT | Mod: 25

## 2018-10-10 PROCEDURE — 76948 ECHO GUIDE OVA ASPIRATION: CPT

## 2018-10-10 PROCEDURE — 58970 RETRIEVAL OF OOCYTE: CPT

## 2018-10-13 ENCOUNTER — APPOINTMENT (OUTPATIENT)
Dept: HUMAN REPRODUCTION | Facility: CLINIC | Age: 43
End: 2018-10-13
Payer: COMMERCIAL

## 2018-10-13 PROCEDURE — 99213 OFFICE O/P EST LOW 20 MIN: CPT

## 2018-10-13 PROCEDURE — 89253 EMBRYO HATCHING: CPT

## 2018-10-14 PROCEDURE — 89272 EXTENDED CULTURE OF OOCYTES: CPT

## 2018-10-15 ENCOUNTER — APPOINTMENT (OUTPATIENT)
Dept: HUMAN REPRODUCTION | Facility: CLINIC | Age: 43
End: 2018-10-15

## 2018-10-16 PROCEDURE — 89342 STORAGE/YEAR EMBRYO(S): CPT

## 2018-10-16 PROCEDURE — 89290 BIOPSY OOCYTE POLAR BODY <=5: CPT

## 2018-10-16 PROCEDURE — 89258 CRYOPRESERVATION EMBRYO(S): CPT

## 2018-10-18 ENCOUNTER — APPOINTMENT (OUTPATIENT)
Dept: HUMAN REPRODUCTION | Facility: CLINIC | Age: 43
End: 2018-10-18
Payer: COMMERCIAL

## 2018-10-18 PROCEDURE — 99212 OFFICE O/P EST SF 10 MIN: CPT

## 2018-10-22 ENCOUNTER — APPOINTMENT (OUTPATIENT)
Dept: HUMAN REPRODUCTION | Facility: CLINIC | Age: 43
End: 2018-10-22
Payer: COMMERCIAL

## 2018-10-22 PROCEDURE — 36415 COLL VENOUS BLD VENIPUNCTURE: CPT

## 2018-10-22 PROCEDURE — 76830 TRANSVAGINAL US NON-OB: CPT

## 2018-10-25 ENCOUNTER — APPOINTMENT (OUTPATIENT)
Dept: HUMAN REPRODUCTION | Facility: CLINIC | Age: 43
End: 2018-10-25

## 2018-10-26 ENCOUNTER — APPOINTMENT (OUTPATIENT)
Dept: HUMAN REPRODUCTION | Facility: CLINIC | Age: 43
End: 2018-10-26

## 2018-10-29 ENCOUNTER — APPOINTMENT (OUTPATIENT)
Dept: HUMAN REPRODUCTION | Facility: CLINIC | Age: 43
End: 2018-10-29
Payer: COMMERCIAL

## 2018-10-29 PROCEDURE — 76830 TRANSVAGINAL US NON-OB: CPT

## 2018-10-29 PROCEDURE — 36415 COLL VENOUS BLD VENIPUNCTURE: CPT

## 2018-10-29 PROCEDURE — 99212 OFFICE O/P EST SF 10 MIN: CPT | Mod: 25

## 2018-11-01 ENCOUNTER — APPOINTMENT (OUTPATIENT)
Dept: HUMAN REPRODUCTION | Facility: CLINIC | Age: 43
End: 2018-11-01
Payer: COMMERCIAL

## 2018-11-01 PROCEDURE — 36415 COLL VENOUS BLD VENIPUNCTURE: CPT

## 2018-11-01 PROCEDURE — 76830 TRANSVAGINAL US NON-OB: CPT

## 2018-11-05 ENCOUNTER — APPOINTMENT (OUTPATIENT)
Dept: HUMAN REPRODUCTION | Facility: CLINIC | Age: 43
End: 2018-11-05
Payer: COMMERCIAL

## 2018-11-05 PROCEDURE — 36415 COLL VENOUS BLD VENIPUNCTURE: CPT

## 2018-11-05 PROCEDURE — 76830 TRANSVAGINAL US NON-OB: CPT

## 2018-11-05 PROCEDURE — 99213 OFFICE O/P EST LOW 20 MIN: CPT | Mod: 25

## 2018-11-07 ENCOUNTER — APPOINTMENT (OUTPATIENT)
Dept: HUMAN REPRODUCTION | Facility: CLINIC | Age: 43
End: 2018-11-07
Payer: COMMERCIAL

## 2018-11-07 PROCEDURE — 36415 COLL VENOUS BLD VENIPUNCTURE: CPT

## 2018-11-07 PROCEDURE — 99212 OFFICE O/P EST SF 10 MIN: CPT | Mod: 25

## 2018-11-07 PROCEDURE — 76830 TRANSVAGINAL US NON-OB: CPT

## 2018-11-09 ENCOUNTER — APPOINTMENT (OUTPATIENT)
Dept: HUMAN REPRODUCTION | Facility: CLINIC | Age: 43
End: 2018-11-09
Payer: COMMERCIAL

## 2018-11-09 PROCEDURE — 99213 OFFICE O/P EST LOW 20 MIN: CPT | Mod: 25

## 2018-11-09 PROCEDURE — 36415 COLL VENOUS BLD VENIPUNCTURE: CPT

## 2018-11-09 PROCEDURE — 76830 TRANSVAGINAL US NON-OB: CPT

## 2018-11-11 ENCOUNTER — APPOINTMENT (OUTPATIENT)
Dept: HUMAN REPRODUCTION | Facility: CLINIC | Age: 43
End: 2018-11-11
Payer: COMMERCIAL

## 2018-11-11 PROCEDURE — 99212 OFFICE O/P EST SF 10 MIN: CPT | Mod: 25

## 2018-11-11 PROCEDURE — 36415 COLL VENOUS BLD VENIPUNCTURE: CPT

## 2018-11-11 PROCEDURE — 76830 TRANSVAGINAL US NON-OB: CPT

## 2018-11-12 ENCOUNTER — APPOINTMENT (OUTPATIENT)
Dept: HUMAN REPRODUCTION | Facility: CLINIC | Age: 43
End: 2018-11-12
Payer: COMMERCIAL

## 2018-11-12 PROCEDURE — 99213 OFFICE O/P EST LOW 20 MIN: CPT | Mod: 25

## 2018-11-12 PROCEDURE — 76830 TRANSVAGINAL US NON-OB: CPT

## 2018-11-12 PROCEDURE — 36415 COLL VENOUS BLD VENIPUNCTURE: CPT

## 2018-11-13 ENCOUNTER — APPOINTMENT (OUTPATIENT)
Dept: HUMAN REPRODUCTION | Facility: CLINIC | Age: 43
End: 2018-11-13
Payer: COMMERCIAL

## 2018-11-13 PROCEDURE — 36415 COLL VENOUS BLD VENIPUNCTURE: CPT

## 2018-11-13 PROCEDURE — 99212 OFFICE O/P EST SF 10 MIN: CPT | Mod: 25

## 2018-11-13 PROCEDURE — 76830 TRANSVAGINAL US NON-OB: CPT

## 2018-11-14 ENCOUNTER — APPOINTMENT (OUTPATIENT)
Dept: HUMAN REPRODUCTION | Facility: CLINIC | Age: 43
End: 2018-11-14
Payer: COMMERCIAL

## 2018-11-14 PROCEDURE — 76948 ECHO GUIDE OVA ASPIRATION: CPT

## 2018-11-14 PROCEDURE — 89280 ASSIST OOCYTE FERTILIZATION: CPT

## 2018-11-14 PROCEDURE — 89254 OOCYTE IDENTIFICATION: CPT

## 2018-11-14 PROCEDURE — 58970 RETRIEVAL OF OOCYTE: CPT

## 2018-11-14 PROCEDURE — 76830 TRANSVAGINAL US NON-OB: CPT

## 2018-11-14 PROCEDURE — 99213 OFFICE O/P EST LOW 20 MIN: CPT | Mod: 25

## 2018-11-14 PROCEDURE — 89250 CULTR OOCYTE/EMBRYO <4 DAYS: CPT

## 2018-11-17 ENCOUNTER — APPOINTMENT (OUTPATIENT)
Dept: HUMAN REPRODUCTION | Facility: CLINIC | Age: 43
End: 2018-11-17

## 2018-11-17 PROCEDURE — 89253 EMBRYO HATCHING: CPT

## 2018-11-18 PROCEDURE — 89272 EXTENDED CULTURE OF OOCYTES: CPT

## 2018-11-19 ENCOUNTER — APPOINTMENT (OUTPATIENT)
Dept: HUMAN REPRODUCTION | Facility: CLINIC | Age: 43
End: 2018-11-19

## 2018-11-19 ENCOUNTER — APPOINTMENT (OUTPATIENT)
Dept: HUMAN REPRODUCTION | Facility: CLINIC | Age: 43
End: 2018-11-19
Payer: COMMERCIAL

## 2018-11-19 PROCEDURE — 36415 COLL VENOUS BLD VENIPUNCTURE: CPT

## 2018-11-19 PROCEDURE — 76830 TRANSVAGINAL US NON-OB: CPT

## 2018-11-19 PROCEDURE — 99213 OFFICE O/P EST LOW 20 MIN: CPT | Mod: 25

## 2018-11-20 PROCEDURE — 89342 STORAGE/YEAR EMBRYO(S): CPT

## 2018-11-20 PROCEDURE — 89258 CRYOPRESERVATION EMBRYO(S): CPT

## 2018-11-20 PROCEDURE — 89290 BIOPSY OOCYTE POLAR BODY <=5: CPT

## 2019-01-25 ENCOUNTER — APPOINTMENT (OUTPATIENT)
Dept: HUMAN REPRODUCTION | Facility: CLINIC | Age: 44
End: 2019-01-25
Payer: COMMERCIAL

## 2019-01-25 PROCEDURE — 99213 OFFICE O/P EST LOW 20 MIN: CPT | Mod: 25

## 2019-01-25 PROCEDURE — 76830 TRANSVAGINAL US NON-OB: CPT

## 2019-01-25 PROCEDURE — 36415 COLL VENOUS BLD VENIPUNCTURE: CPT

## 2019-02-01 ENCOUNTER — APPOINTMENT (OUTPATIENT)
Dept: HUMAN REPRODUCTION | Facility: CLINIC | Age: 44
End: 2019-02-01
Payer: COMMERCIAL

## 2019-02-01 PROCEDURE — 99214 OFFICE O/P EST MOD 30 MIN: CPT

## 2019-02-07 ENCOUNTER — APPOINTMENT (OUTPATIENT)
Dept: HUMAN REPRODUCTION | Facility: CLINIC | Age: 44
End: 2019-02-07
Payer: COMMERCIAL

## 2019-02-07 PROCEDURE — 76831 ECHO EXAM UTERUS: CPT

## 2019-02-07 PROCEDURE — 58340 CATHETER FOR HYSTEROGRAPHY: CPT

## 2019-02-07 PROCEDURE — 99213 OFFICE O/P EST LOW 20 MIN: CPT | Mod: 25

## 2019-02-07 PROCEDURE — 81025 URINE PREGNANCY TEST: CPT

## 2019-02-07 PROCEDURE — 58999 UNLISTED PX FML GENITAL SYS: CPT

## 2019-02-15 ENCOUNTER — APPOINTMENT (OUTPATIENT)
Dept: HUMAN REPRODUCTION | Facility: CLINIC | Age: 44
End: 2019-02-15

## 2019-02-16 ENCOUNTER — APPOINTMENT (OUTPATIENT)
Dept: HUMAN REPRODUCTION | Facility: CLINIC | Age: 44
End: 2019-02-16
Payer: COMMERCIAL

## 2019-02-16 PROCEDURE — 36415 COLL VENOUS BLD VENIPUNCTURE: CPT

## 2019-02-16 PROCEDURE — 99213 OFFICE O/P EST LOW 20 MIN: CPT | Mod: 25

## 2019-02-16 PROCEDURE — 76830 TRANSVAGINAL US NON-OB: CPT

## 2019-02-20 ENCOUNTER — APPOINTMENT (OUTPATIENT)
Dept: HUMAN REPRODUCTION | Facility: CLINIC | Age: 44
End: 2019-02-20
Payer: COMMERCIAL

## 2019-02-20 PROCEDURE — 36415 COLL VENOUS BLD VENIPUNCTURE: CPT

## 2019-02-20 PROCEDURE — 76830 TRANSVAGINAL US NON-OB: CPT

## 2019-02-20 PROCEDURE — 99212 OFFICE O/P EST SF 10 MIN: CPT | Mod: 25

## 2019-02-22 ENCOUNTER — APPOINTMENT (OUTPATIENT)
Dept: HUMAN REPRODUCTION | Facility: CLINIC | Age: 44
End: 2019-02-22
Payer: COMMERCIAL

## 2019-02-22 PROCEDURE — 58322 ARTIFICIAL INSEMINATION: CPT

## 2019-02-22 PROCEDURE — 76830 TRANSVAGINAL US NON-OB: CPT

## 2019-02-22 PROCEDURE — 89261 SPERM ISOLATION COMPLEX: CPT

## 2019-02-22 PROCEDURE — 99213 OFFICE O/P EST LOW 20 MIN: CPT | Mod: 25

## 2019-02-22 PROCEDURE — 36415 COLL VENOUS BLD VENIPUNCTURE: CPT

## 2019-02-23 ENCOUNTER — APPOINTMENT (OUTPATIENT)
Dept: HUMAN REPRODUCTION | Facility: CLINIC | Age: 44
End: 2019-02-23
Payer: COMMERCIAL

## 2019-02-23 PROCEDURE — 76830 TRANSVAGINAL US NON-OB: CPT

## 2019-02-23 PROCEDURE — 99214 OFFICE O/P EST MOD 30 MIN: CPT | Mod: 25

## 2019-02-23 PROCEDURE — 0: CUSTOM

## 2019-02-23 PROCEDURE — 89261 SPERM ISOLATION COMPLEX: CPT

## 2019-02-23 PROCEDURE — 58322 ARTIFICIAL INSEMINATION: CPT

## 2019-03-08 ENCOUNTER — APPOINTMENT (OUTPATIENT)
Dept: HUMAN REPRODUCTION | Facility: CLINIC | Age: 44
End: 2019-03-08
Payer: COMMERCIAL

## 2019-03-08 PROCEDURE — 36415 COLL VENOUS BLD VENIPUNCTURE: CPT

## 2019-03-11 ENCOUNTER — APPOINTMENT (OUTPATIENT)
Dept: HUMAN REPRODUCTION | Facility: CLINIC | Age: 44
End: 2019-03-11
Payer: COMMERCIAL

## 2019-03-11 PROCEDURE — 99213 OFFICE O/P EST LOW 20 MIN: CPT | Mod: 25

## 2019-03-11 PROCEDURE — 36415 COLL VENOUS BLD VENIPUNCTURE: CPT

## 2019-03-11 PROCEDURE — 76830 TRANSVAGINAL US NON-OB: CPT

## 2019-03-15 ENCOUNTER — APPOINTMENT (OUTPATIENT)
Dept: HUMAN REPRODUCTION | Facility: CLINIC | Age: 44
End: 2019-03-15
Payer: COMMERCIAL

## 2019-03-15 PROCEDURE — 99212 OFFICE O/P EST SF 10 MIN: CPT | Mod: 25

## 2019-03-15 PROCEDURE — 76830 TRANSVAGINAL US NON-OB: CPT

## 2019-03-15 PROCEDURE — 36415 COLL VENOUS BLD VENIPUNCTURE: CPT

## 2019-03-21 ENCOUNTER — APPOINTMENT (OUTPATIENT)
Dept: HUMAN REPRODUCTION | Facility: CLINIC | Age: 44
End: 2019-03-21
Payer: COMMERCIAL

## 2019-03-21 PROCEDURE — 99212 OFFICE O/P EST SF 10 MIN: CPT | Mod: 25

## 2019-03-21 PROCEDURE — 76830 TRANSVAGINAL US NON-OB: CPT

## 2019-03-21 PROCEDURE — 36415 COLL VENOUS BLD VENIPUNCTURE: CPT

## 2019-03-25 ENCOUNTER — APPOINTMENT (OUTPATIENT)
Dept: HUMAN REPRODUCTION | Facility: CLINIC | Age: 44
End: 2019-03-25
Payer: COMMERCIAL

## 2019-03-25 PROCEDURE — 36415 COLL VENOUS BLD VENIPUNCTURE: CPT

## 2019-03-26 ENCOUNTER — APPOINTMENT (OUTPATIENT)
Dept: HUMAN REPRODUCTION | Facility: CLINIC | Age: 44
End: 2019-03-26
Payer: COMMERCIAL

## 2019-03-26 PROCEDURE — 89250 CULTR OOCYTE/EMBRYO <4 DAYS: CPT

## 2019-03-26 PROCEDURE — 76942 ECHO GUIDE FOR BIOPSY: CPT

## 2019-03-26 PROCEDURE — 58974 EMBRYO TRANSFER INTRAUTERINE: CPT

## 2019-03-26 PROCEDURE — 89255 PREPARE EMBRYO FOR TRANSFER: CPT

## 2019-03-26 PROCEDURE — 89398 UNLISTED REPROD MED LAB PROC: CPT

## 2019-03-26 PROCEDURE — 89352 THAWING CRYOPRESRVED EMBRYO: CPT

## 2019-04-05 ENCOUNTER — APPOINTMENT (OUTPATIENT)
Dept: HUMAN REPRODUCTION | Facility: CLINIC | Age: 44
End: 2019-04-05
Payer: COMMERCIAL

## 2019-04-05 PROCEDURE — 36415 COLL VENOUS BLD VENIPUNCTURE: CPT

## 2019-04-08 ENCOUNTER — APPOINTMENT (OUTPATIENT)
Dept: HUMAN REPRODUCTION | Facility: CLINIC | Age: 44
End: 2019-04-08
Payer: COMMERCIAL

## 2019-04-08 PROCEDURE — 36415 COLL VENOUS BLD VENIPUNCTURE: CPT

## 2019-04-12 ENCOUNTER — APPOINTMENT (OUTPATIENT)
Dept: HUMAN REPRODUCTION | Facility: CLINIC | Age: 44
End: 2019-04-12
Payer: COMMERCIAL

## 2019-04-12 PROCEDURE — 99213 OFFICE O/P EST LOW 20 MIN: CPT | Mod: 25

## 2019-04-12 PROCEDURE — 36415 COLL VENOUS BLD VENIPUNCTURE: CPT

## 2019-04-12 PROCEDURE — 76830 TRANSVAGINAL US NON-OB: CPT

## 2019-04-18 ENCOUNTER — APPOINTMENT (OUTPATIENT)
Dept: HUMAN REPRODUCTION | Facility: CLINIC | Age: 44
End: 2019-04-18
Payer: COMMERCIAL

## 2019-04-18 PROCEDURE — 36415 COLL VENOUS BLD VENIPUNCTURE: CPT

## 2019-04-18 PROCEDURE — 99214 OFFICE O/P EST MOD 30 MIN: CPT | Mod: 25

## 2019-04-18 PROCEDURE — 76817 TRANSVAGINAL US OBSTETRIC: CPT

## 2019-04-29 ENCOUNTER — APPOINTMENT (OUTPATIENT)
Dept: HUMAN REPRODUCTION | Facility: CLINIC | Age: 44
End: 2019-04-29
Payer: COMMERCIAL

## 2019-04-29 PROCEDURE — 76817 TRANSVAGINAL US OBSTETRIC: CPT

## 2019-04-29 PROCEDURE — 99214 OFFICE O/P EST MOD 30 MIN: CPT | Mod: 25

## 2019-05-28 ENCOUNTER — APPOINTMENT (OUTPATIENT)
Dept: ANTEPARTUM | Facility: CLINIC | Age: 44
End: 2019-05-28
Payer: COMMERCIAL

## 2019-05-28 ENCOUNTER — LABORATORY RESULT (OUTPATIENT)
Age: 44
End: 2019-05-28

## 2019-05-28 PROCEDURE — 76813 OB US NUCHAL MEAS 1 GEST: CPT

## 2019-05-28 PROCEDURE — 76801 OB US < 14 WKS SINGLE FETUS: CPT

## 2019-06-27 ENCOUNTER — APPOINTMENT (OUTPATIENT)
Dept: ANTEPARTUM | Facility: CLINIC | Age: 44
End: 2019-06-27
Payer: COMMERCIAL

## 2019-06-27 PROCEDURE — 76805 OB US >/= 14 WKS SNGL FETUS: CPT

## 2019-06-27 PROCEDURE — 76817 TRANSVAGINAL US OBSTETRIC: CPT

## 2019-07-31 ENCOUNTER — APPOINTMENT (OUTPATIENT)
Dept: ANTEPARTUM | Facility: CLINIC | Age: 44
End: 2019-07-31
Payer: COMMERCIAL

## 2019-07-31 PROCEDURE — 76817 TRANSVAGINAL US OBSTETRIC: CPT | Mod: 59

## 2019-07-31 PROCEDURE — 76811 OB US DETAILED SNGL FETUS: CPT

## 2019-08-13 ENCOUNTER — APPOINTMENT (OUTPATIENT)
Dept: ANTEPARTUM | Facility: CLINIC | Age: 44
End: 2019-08-13
Payer: COMMERCIAL

## 2019-08-13 PROCEDURE — 76816 OB US FOLLOW-UP PER FETUS: CPT

## 2019-09-19 ENCOUNTER — APPOINTMENT (OUTPATIENT)
Dept: ANTEPARTUM | Facility: CLINIC | Age: 44
End: 2019-09-19
Payer: COMMERCIAL

## 2019-09-19 PROCEDURE — 76816 OB US FOLLOW-UP PER FETUS: CPT

## 2019-10-15 ENCOUNTER — APPOINTMENT (OUTPATIENT)
Dept: ANTEPARTUM | Facility: CLINIC | Age: 44
End: 2019-10-15

## 2019-10-27 ENCOUNTER — OUTPATIENT (OUTPATIENT)
Dept: OUTPATIENT SERVICES | Facility: HOSPITAL | Age: 44
LOS: 1 days | End: 2019-10-27
Payer: COMMERCIAL

## 2019-10-27 DIAGNOSIS — O26.899 OTHER SPECIFIED PREGNANCY RELATED CONDITIONS, UNSPECIFIED TRIMESTER: ICD-10-CM

## 2019-10-27 DIAGNOSIS — Z3A.00 WEEKS OF GESTATION OF PREGNANCY NOT SPECIFIED: ICD-10-CM

## 2019-10-27 LAB
ALBUMIN SERPL ELPH-MCNC: 3.3 G/DL — SIGNIFICANT CHANGE UP (ref 3.3–5)
ALP SERPL-CCNC: 105 U/L — SIGNIFICANT CHANGE UP (ref 40–120)
ALT FLD-CCNC: 10 U/L — SIGNIFICANT CHANGE UP (ref 10–45)
ANION GAP SERPL CALC-SCNC: 10 MMOL/L — SIGNIFICANT CHANGE UP (ref 5–17)
APTT BLD: 29.1 SEC — SIGNIFICANT CHANGE UP (ref 27.5–36.3)
AST SERPL-CCNC: 13 U/L — SIGNIFICANT CHANGE UP (ref 10–40)
BASOPHILS # BLD AUTO: 0.02 K/UL — SIGNIFICANT CHANGE UP (ref 0–0.2)
BASOPHILS NFR BLD AUTO: 0.2 % — SIGNIFICANT CHANGE UP (ref 0–2)
BILIRUB SERPL-MCNC: 0.2 MG/DL — SIGNIFICANT CHANGE UP (ref 0.2–1.2)
BUN SERPL-MCNC: 7 MG/DL — SIGNIFICANT CHANGE UP (ref 7–23)
CALCIUM SERPL-MCNC: 9.2 MG/DL — SIGNIFICANT CHANGE UP (ref 8.4–10.5)
CHLORIDE SERPL-SCNC: 103 MMOL/L — SIGNIFICANT CHANGE UP (ref 96–108)
CO2 SERPL-SCNC: 20 MMOL/L — LOW (ref 22–31)
CREAT ?TM UR-MCNC: 47 MG/DL — SIGNIFICANT CHANGE UP
CREAT SERPL-MCNC: 0.48 MG/DL — LOW (ref 0.5–1.3)
EOSINOPHIL # BLD AUTO: 0.06 K/UL — SIGNIFICANT CHANGE UP (ref 0–0.5)
EOSINOPHIL NFR BLD AUTO: 0.7 % — SIGNIFICANT CHANGE UP (ref 0–6)
FIBRINOGEN PPP-MCNC: 591 MG/DL — HIGH (ref 258–438)
GLUCOSE SERPL-MCNC: 98 MG/DL — SIGNIFICANT CHANGE UP (ref 70–99)
HCT VFR BLD CALC: 35.9 % — SIGNIFICANT CHANGE UP (ref 34.5–45)
HGB BLD-MCNC: 12.1 G/DL — SIGNIFICANT CHANGE UP (ref 11.5–15.5)
IMM GRANULOCYTES NFR BLD AUTO: 0.4 % — SIGNIFICANT CHANGE UP (ref 0–1.5)
INR BLD: 0.96 — SIGNIFICANT CHANGE UP (ref 0.88–1.16)
LDH SERPL L TO P-CCNC: 170 U/L — SIGNIFICANT CHANGE UP (ref 50–242)
LYMPHOCYTES # BLD AUTO: 1.82 K/UL — SIGNIFICANT CHANGE UP (ref 1–3.3)
LYMPHOCYTES # BLD AUTO: 22.2 % — SIGNIFICANT CHANGE UP (ref 13–44)
MCHC RBC-ENTMCNC: 30.5 PG — SIGNIFICANT CHANGE UP (ref 27–34)
MCHC RBC-ENTMCNC: 33.7 GM/DL — SIGNIFICANT CHANGE UP (ref 32–36)
MCV RBC AUTO: 90.4 FL — SIGNIFICANT CHANGE UP (ref 80–100)
MONOCYTES # BLD AUTO: 0.47 K/UL — SIGNIFICANT CHANGE UP (ref 0–0.9)
MONOCYTES NFR BLD AUTO: 5.7 % — SIGNIFICANT CHANGE UP (ref 2–14)
NEUTROPHILS # BLD AUTO: 5.79 K/UL — SIGNIFICANT CHANGE UP (ref 1.8–7.4)
NEUTROPHILS NFR BLD AUTO: 70.8 % — SIGNIFICANT CHANGE UP (ref 43–77)
NRBC # BLD: 0 /100 WBCS — SIGNIFICANT CHANGE UP (ref 0–0)
PLATELET # BLD AUTO: 289 K/UL — SIGNIFICANT CHANGE UP (ref 150–400)
POTASSIUM SERPL-MCNC: 4.1 MMOL/L — SIGNIFICANT CHANGE UP (ref 3.5–5.3)
POTASSIUM SERPL-SCNC: 4.1 MMOL/L — SIGNIFICANT CHANGE UP (ref 3.5–5.3)
PROT ?TM UR-MCNC: 6 MG/DL — SIGNIFICANT CHANGE UP (ref 0–12)
PROT SERPL-MCNC: 6.3 G/DL — SIGNIFICANT CHANGE UP (ref 6–8.3)
PROT/CREAT UR-RTO: 0.1 RATIO — SIGNIFICANT CHANGE UP (ref 0–0.2)
PROTHROM AB SERPL-ACNC: 10.8 SEC — SIGNIFICANT CHANGE UP (ref 10–12.9)
RBC # BLD: 3.97 M/UL — SIGNIFICANT CHANGE UP (ref 3.8–5.2)
RBC # FLD: 12.8 % — SIGNIFICANT CHANGE UP (ref 10.3–14.5)
SODIUM SERPL-SCNC: 133 MMOL/L — LOW (ref 135–145)
URATE SERPL-MCNC: 3.6 MG/DL — SIGNIFICANT CHANGE UP (ref 2.5–7)
WBC # BLD: 8.19 K/UL — SIGNIFICANT CHANGE UP (ref 3.8–10.5)
WBC # FLD AUTO: 8.19 K/UL — SIGNIFICANT CHANGE UP (ref 3.8–10.5)

## 2019-10-27 PROCEDURE — 99214 OFFICE O/P EST MOD 30 MIN: CPT

## 2019-10-27 PROCEDURE — 85610 PROTHROMBIN TIME: CPT

## 2019-10-27 PROCEDURE — 82570 ASSAY OF URINE CREATININE: CPT

## 2019-10-27 PROCEDURE — 85025 COMPLETE CBC W/AUTO DIFF WBC: CPT

## 2019-10-27 PROCEDURE — 36415 COLL VENOUS BLD VENIPUNCTURE: CPT

## 2019-10-27 PROCEDURE — 83615 LACTATE (LD) (LDH) ENZYME: CPT

## 2019-10-27 PROCEDURE — 85730 THROMBOPLASTIN TIME PARTIAL: CPT

## 2019-10-27 PROCEDURE — 76818 FETAL BIOPHYS PROFILE W/NST: CPT

## 2019-10-27 PROCEDURE — 80053 COMPREHEN METABOLIC PANEL: CPT

## 2019-10-27 PROCEDURE — 84550 ASSAY OF BLOOD/URIC ACID: CPT

## 2019-10-27 PROCEDURE — 84156 ASSAY OF PROTEIN URINE: CPT

## 2019-10-27 PROCEDURE — 85384 FIBRINOGEN ACTIVITY: CPT

## 2019-10-29 ENCOUNTER — APPOINTMENT (OUTPATIENT)
Dept: ANTEPARTUM | Facility: CLINIC | Age: 44
End: 2019-10-29
Payer: COMMERCIAL

## 2019-10-29 PROCEDURE — 76819 FETAL BIOPHYS PROFIL W/O NST: CPT

## 2019-11-05 ENCOUNTER — APPOINTMENT (OUTPATIENT)
Dept: ANTEPARTUM | Facility: CLINIC | Age: 44
End: 2019-11-05

## 2019-11-06 ENCOUNTER — APPOINTMENT (OUTPATIENT)
Dept: ANTEPARTUM | Facility: CLINIC | Age: 44
End: 2019-11-06
Payer: COMMERCIAL

## 2019-11-06 PROCEDURE — 76816 OB US FOLLOW-UP PER FETUS: CPT

## 2019-11-12 ENCOUNTER — APPOINTMENT (OUTPATIENT)
Dept: ANTEPARTUM | Facility: CLINIC | Age: 44
End: 2019-11-12
Payer: COMMERCIAL

## 2019-11-12 PROCEDURE — 76818 FETAL BIOPHYS PROFILE W/NST: CPT

## 2019-11-19 ENCOUNTER — APPOINTMENT (OUTPATIENT)
Dept: ANTEPARTUM | Facility: CLINIC | Age: 44
End: 2019-11-19
Payer: COMMERCIAL

## 2019-11-19 PROCEDURE — 76818 FETAL BIOPHYS PROFILE W/NST: CPT

## 2019-11-20 ENCOUNTER — INPATIENT (INPATIENT)
Facility: HOSPITAL | Age: 44
LOS: 4 days | Discharge: HOME | End: 2019-11-25
Attending: OBSTETRICS & GYNECOLOGY | Admitting: OBSTETRICS & GYNECOLOGY
Payer: COMMERCIAL

## 2019-11-20 ENCOUNTER — RESULT REVIEW (OUTPATIENT)
Age: 44
End: 2019-11-20

## 2019-11-20 VITALS — HEIGHT: 71 IN | WEIGHT: 229.28 LBS

## 2019-11-20 LAB
ALBUMIN SERPL ELPH-MCNC: 3.2 G/DL — LOW (ref 3.3–5)
ALP SERPL-CCNC: 116 U/L — SIGNIFICANT CHANGE UP (ref 40–120)
ALT FLD-CCNC: 12 U/L — SIGNIFICANT CHANGE UP (ref 10–45)
ANION GAP SERPL CALC-SCNC: 9 MMOL/L — SIGNIFICANT CHANGE UP (ref 5–17)
APTT BLD: 26.9 SEC — LOW (ref 27.5–36.3)
AST SERPL-CCNC: 13 U/L — SIGNIFICANT CHANGE UP (ref 10–40)
BASOPHILS # BLD AUTO: 0.02 K/UL — SIGNIFICANT CHANGE UP (ref 0–0.2)
BASOPHILS NFR BLD AUTO: 0.3 % — SIGNIFICANT CHANGE UP (ref 0–2)
BILIRUB SERPL-MCNC: 0.3 MG/DL — SIGNIFICANT CHANGE UP (ref 0.2–1.2)
BUN SERPL-MCNC: 11 MG/DL — SIGNIFICANT CHANGE UP (ref 7–23)
CALCIUM SERPL-MCNC: 9.1 MG/DL — SIGNIFICANT CHANGE UP (ref 8.4–10.5)
CHLORIDE SERPL-SCNC: 107 MMOL/L — SIGNIFICANT CHANGE UP (ref 96–108)
CO2 SERPL-SCNC: 21 MMOL/L — LOW (ref 22–31)
CREAT ?TM UR-MCNC: 110 MG/DL — SIGNIFICANT CHANGE UP
CREAT SERPL-MCNC: 0.52 MG/DL — SIGNIFICANT CHANGE UP (ref 0.5–1.3)
EOSINOPHIL # BLD AUTO: 0.06 K/UL — SIGNIFICANT CHANGE UP (ref 0–0.5)
EOSINOPHIL NFR BLD AUTO: 0.8 % — SIGNIFICANT CHANGE UP (ref 0–6)
FIBRINOGEN PPP-MCNC: 612 MG/DL — HIGH (ref 258–438)
GLUCOSE SERPL-MCNC: 124 MG/DL — HIGH (ref 70–99)
HCT VFR BLD CALC: 36.5 % — SIGNIFICANT CHANGE UP (ref 34.5–45)
HGB BLD-MCNC: 12.2 G/DL — SIGNIFICANT CHANGE UP (ref 11.5–15.5)
IMM GRANULOCYTES NFR BLD AUTO: 0.4 % — SIGNIFICANT CHANGE UP (ref 0–1.5)
INR BLD: 1 — SIGNIFICANT CHANGE UP (ref 0.88–1.16)
LDH SERPL L TO P-CCNC: 158 U/L — SIGNIFICANT CHANGE UP (ref 50–242)
LYMPHOCYTES # BLD AUTO: 1.19 K/UL — SIGNIFICANT CHANGE UP (ref 1–3.3)
LYMPHOCYTES # BLD AUTO: 16.4 % — SIGNIFICANT CHANGE UP (ref 13–44)
MCHC RBC-ENTMCNC: 30 PG — SIGNIFICANT CHANGE UP (ref 27–34)
MCHC RBC-ENTMCNC: 33.4 GM/DL — SIGNIFICANT CHANGE UP (ref 32–36)
MCV RBC AUTO: 89.7 FL — SIGNIFICANT CHANGE UP (ref 80–100)
MONOCYTES # BLD AUTO: 0.41 K/UL — SIGNIFICANT CHANGE UP (ref 0–0.9)
MONOCYTES NFR BLD AUTO: 5.7 % — SIGNIFICANT CHANGE UP (ref 2–14)
NEUTROPHILS # BLD AUTO: 5.53 K/UL — SIGNIFICANT CHANGE UP (ref 1.8–7.4)
NEUTROPHILS NFR BLD AUTO: 76.4 % — SIGNIFICANT CHANGE UP (ref 43–77)
NRBC # BLD: 0 /100 WBCS — SIGNIFICANT CHANGE UP (ref 0–0)
PLATELET # BLD AUTO: 278 K/UL — SIGNIFICANT CHANGE UP (ref 150–400)
POTASSIUM SERPL-MCNC: 4.1 MMOL/L — SIGNIFICANT CHANGE UP (ref 3.5–5.3)
POTASSIUM SERPL-SCNC: 4.1 MMOL/L — SIGNIFICANT CHANGE UP (ref 3.5–5.3)
PROT ?TM UR-MCNC: 17 MG/DL — HIGH (ref 0–12)
PROT SERPL-MCNC: 6.2 G/DL — SIGNIFICANT CHANGE UP (ref 6–8.3)
PROT/CREAT UR-RTO: 0.2 RATIO — SIGNIFICANT CHANGE UP (ref 0–0.2)
PROTHROM AB SERPL-ACNC: 11.3 SEC — SIGNIFICANT CHANGE UP (ref 10–12.9)
RBC # BLD: 4.07 M/UL — SIGNIFICANT CHANGE UP (ref 3.8–5.2)
RBC # FLD: 12.8 % — SIGNIFICANT CHANGE UP (ref 10.3–14.5)
SODIUM SERPL-SCNC: 137 MMOL/L — SIGNIFICANT CHANGE UP (ref 135–145)
T PALLIDUM AB TITR SER: NEGATIVE — SIGNIFICANT CHANGE UP
URATE SERPL-MCNC: 3.8 MG/DL — SIGNIFICANT CHANGE UP (ref 2.5–7)
WBC # BLD: 7.24 K/UL — SIGNIFICANT CHANGE UP (ref 3.8–10.5)
WBC # FLD AUTO: 7.24 K/UL — SIGNIFICANT CHANGE UP (ref 3.8–10.5)

## 2019-11-20 RX ORDER — ALBUTEROL 90 UG/1
2 AEROSOL, METERED ORAL EVERY 6 HOURS
Refills: 0 | Status: DISCONTINUED | OUTPATIENT
Start: 2019-11-20 | End: 2019-11-21

## 2019-11-20 RX ORDER — FENTANYL/BUPIVACAINE/NS/PF 2MCG/ML-.1
250 PLASTIC BAG, INJECTION (ML) INJECTION
Refills: 0 | Status: DISCONTINUED | OUTPATIENT
Start: 2019-11-20 | End: 2019-11-21

## 2019-11-20 RX ORDER — LABETALOL HCL 100 MG
500 TABLET ORAL EVERY 12 HOURS
Refills: 0 | Status: DISCONTINUED | OUTPATIENT
Start: 2019-11-20 | End: 2019-11-21

## 2019-11-20 RX ORDER — OXYTOCIN 10 UNIT/ML
2 VIAL (ML) INJECTION
Qty: 30 | Refills: 0 | Status: DISCONTINUED | OUTPATIENT
Start: 2019-11-20 | End: 2019-11-21

## 2019-11-20 RX ORDER — OXYTOCIN 10 UNIT/ML
1 VIAL (ML) INJECTION
Qty: 30 | Refills: 0 | Status: DISCONTINUED | OUTPATIENT
Start: 2019-11-20 | End: 2019-11-21

## 2019-11-20 RX ORDER — SODIUM CHLORIDE 9 MG/ML
1000 INJECTION, SOLUTION INTRAVENOUS
Refills: 0 | Status: DISCONTINUED | OUTPATIENT
Start: 2019-11-20 | End: 2019-11-21

## 2019-11-20 RX ORDER — OXYTOCIN 10 UNIT/ML
333.33 VIAL (ML) INJECTION
Qty: 20 | Refills: 0 | Status: DISCONTINUED | OUTPATIENT
Start: 2019-11-20 | End: 2019-11-21

## 2019-11-20 RX ADMIN — Medication 500 MILLIGRAM(S): at 19:30

## 2019-11-20 RX ADMIN — SODIUM CHLORIDE 125 MILLILITER(S): 9 INJECTION, SOLUTION INTRAVENOUS at 10:30

## 2019-11-20 RX ADMIN — SODIUM CHLORIDE 125 MILLILITER(S): 9 INJECTION, SOLUTION INTRAVENOUS at 22:01

## 2019-11-21 RX ORDER — OXYCODONE HYDROCHLORIDE 5 MG/1
5 TABLET ORAL
Refills: 0 | Status: DISCONTINUED | OUTPATIENT
Start: 2019-11-21 | End: 2019-11-25

## 2019-11-21 RX ORDER — NALOXONE HYDROCHLORIDE 4 MG/.1ML
0.1 SPRAY NASAL
Refills: 0 | Status: DISCONTINUED | OUTPATIENT
Start: 2019-11-21 | End: 2019-11-21

## 2019-11-21 RX ORDER — ACETAMINOPHEN 500 MG
975 TABLET ORAL
Refills: 0 | Status: DISCONTINUED | OUTPATIENT
Start: 2019-11-21 | End: 2019-11-25

## 2019-11-21 RX ORDER — AZITHROMYCIN 500 MG/1
500 TABLET, FILM COATED ORAL ONCE
Refills: 0 | Status: DISCONTINUED | OUTPATIENT
Start: 2019-11-21 | End: 2019-11-21

## 2019-11-21 RX ORDER — MORPHINE SULFATE 50 MG/1
3 CAPSULE, EXTENDED RELEASE ORAL ONCE
Refills: 0 | Status: DISCONTINUED | OUTPATIENT
Start: 2019-11-21 | End: 2019-11-21

## 2019-11-21 RX ORDER — SODIUM CHLORIDE 9 MG/ML
1000 INJECTION, SOLUTION INTRAVENOUS
Refills: 0 | Status: DISCONTINUED | OUTPATIENT
Start: 2019-11-21 | End: 2019-11-21

## 2019-11-21 RX ORDER — LANOLIN
1 OINTMENT (GRAM) TOPICAL EVERY 6 HOURS
Refills: 0 | Status: DISCONTINUED | OUTPATIENT
Start: 2019-11-21 | End: 2019-11-25

## 2019-11-21 RX ORDER — LABETALOL HCL 100 MG
500 TABLET ORAL
Refills: 0 | Status: DISCONTINUED | OUTPATIENT
Start: 2019-11-21 | End: 2019-11-22

## 2019-11-21 RX ORDER — DIPHENHYDRAMINE HCL 50 MG
25 CAPSULE ORAL EVERY 6 HOURS
Refills: 0 | Status: DISCONTINUED | OUTPATIENT
Start: 2019-11-21 | End: 2019-11-25

## 2019-11-21 RX ORDER — KETOROLAC TROMETHAMINE 30 MG/ML
30 SYRINGE (ML) INJECTION EVERY 6 HOURS
Refills: 0 | Status: COMPLETED | OUTPATIENT
Start: 2019-11-21 | End: 2019-11-22

## 2019-11-21 RX ORDER — OXYTOCIN 10 UNIT/ML
333.33 VIAL (ML) INJECTION
Qty: 20 | Refills: 0 | Status: DISCONTINUED | OUTPATIENT
Start: 2019-11-21 | End: 2019-11-25

## 2019-11-21 RX ORDER — SODIUM CHLORIDE 9 MG/ML
1000 INJECTION, SOLUTION INTRAVENOUS
Refills: 0 | Status: DISCONTINUED | OUTPATIENT
Start: 2019-11-21 | End: 2019-11-25

## 2019-11-21 RX ORDER — SODIUM CHLORIDE 9 MG/ML
1000 INJECTION, SOLUTION INTRAVENOUS ONCE
Refills: 0 | Status: DISCONTINUED | OUTPATIENT
Start: 2019-11-21 | End: 2019-11-21

## 2019-11-21 RX ORDER — TETANUS TOXOID, REDUCED DIPHTHERIA TOXOID AND ACELLULAR PERTUSSIS VACCINE, ADSORBED 5; 2.5; 8; 8; 2.5 [IU]/.5ML; [IU]/.5ML; UG/.5ML; UG/.5ML; UG/.5ML
0.5 SUSPENSION INTRAMUSCULAR ONCE
Refills: 0 | Status: DISCONTINUED | OUTPATIENT
Start: 2019-11-21 | End: 2019-11-25

## 2019-11-21 RX ORDER — OXYTOCIN 10 UNIT/ML
333.33 VIAL (ML) INJECTION
Qty: 20 | Refills: 0 | Status: DISCONTINUED | OUTPATIENT
Start: 2019-11-21 | End: 2019-11-21

## 2019-11-21 RX ORDER — CITRIC ACID/SODIUM CITRATE 300-500 MG
30 SOLUTION, ORAL ORAL ONCE
Refills: 0 | Status: DISCONTINUED | OUTPATIENT
Start: 2019-11-21 | End: 2019-11-21

## 2019-11-21 RX ORDER — DEXAMETHASONE 0.5 MG/5ML
4 ELIXIR ORAL EVERY 6 HOURS
Refills: 0 | Status: DISCONTINUED | OUTPATIENT
Start: 2019-11-21 | End: 2019-11-21

## 2019-11-21 RX ORDER — METOCLOPRAMIDE HCL 10 MG
10 TABLET ORAL ONCE
Refills: 0 | Status: DISCONTINUED | OUTPATIENT
Start: 2019-11-21 | End: 2019-11-21

## 2019-11-21 RX ORDER — CEFAZOLIN SODIUM 1 G
2000 VIAL (EA) INJECTION ONCE
Refills: 0 | Status: DISCONTINUED | OUTPATIENT
Start: 2019-11-21 | End: 2019-11-21

## 2019-11-21 RX ORDER — GLYCERIN ADULT
1 SUPPOSITORY, RECTAL RECTAL AT BEDTIME
Refills: 0 | Status: DISCONTINUED | OUTPATIENT
Start: 2019-11-21 | End: 2019-11-25

## 2019-11-21 RX ORDER — FAMOTIDINE 10 MG/ML
20 INJECTION INTRAVENOUS ONCE
Refills: 0 | Status: DISCONTINUED | OUTPATIENT
Start: 2019-11-21 | End: 2019-11-21

## 2019-11-21 RX ORDER — GENTAMICIN SULFATE 40 MG/ML
320 VIAL (ML) INJECTION ONCE
Refills: 0 | Status: DISCONTINUED | OUTPATIENT
Start: 2019-11-21 | End: 2019-11-21

## 2019-11-21 RX ORDER — MAGNESIUM HYDROXIDE 400 MG/1
30 TABLET, CHEWABLE ORAL
Refills: 0 | Status: DISCONTINUED | OUTPATIENT
Start: 2019-11-21 | End: 2019-11-25

## 2019-11-21 RX ORDER — OXYCODONE HYDROCHLORIDE 5 MG/1
5 TABLET ORAL ONCE
Refills: 0 | Status: DISCONTINUED | OUTPATIENT
Start: 2019-11-21 | End: 2019-11-25

## 2019-11-21 RX ORDER — ONDANSETRON 8 MG/1
4 TABLET, FILM COATED ORAL EVERY 6 HOURS
Refills: 0 | Status: DISCONTINUED | OUTPATIENT
Start: 2019-11-21 | End: 2019-11-25

## 2019-11-21 RX ORDER — IBUPROFEN 200 MG
600 TABLET ORAL EVERY 6 HOURS
Refills: 0 | Status: COMPLETED | OUTPATIENT
Start: 2019-11-21 | End: 2020-10-19

## 2019-11-21 RX ORDER — SIMETHICONE 80 MG/1
80 TABLET, CHEWABLE ORAL EVERY 4 HOURS
Refills: 0 | Status: DISCONTINUED | OUTPATIENT
Start: 2019-11-21 | End: 2019-11-25

## 2019-11-21 RX ORDER — HEPARIN SODIUM 5000 [USP'U]/ML
5000 INJECTION INTRAVENOUS; SUBCUTANEOUS EVERY 12 HOURS
Refills: 0 | Status: DISCONTINUED | OUTPATIENT
Start: 2019-11-21 | End: 2019-11-25

## 2019-11-21 RX ADMIN — Medication 30 MILLIGRAM(S): at 19:10

## 2019-11-21 RX ADMIN — HEPARIN SODIUM 5000 UNIT(S): 5000 INJECTION INTRAVENOUS; SUBCUTANEOUS at 21:20

## 2019-11-21 RX ADMIN — Medication 975 MILLIGRAM(S): at 21:20

## 2019-11-21 RX ADMIN — Medication 30 MILLILITER(S): at 07:35

## 2019-11-21 RX ADMIN — Medication 30 MILLIGRAM(S): at 18:13

## 2019-11-21 RX ADMIN — Medication 30 MILLIGRAM(S): at 23:15

## 2019-11-21 RX ADMIN — Medication 30 MILLIGRAM(S): at 23:45

## 2019-11-21 RX ADMIN — Medication 975 MILLIGRAM(S): at 22:20

## 2019-11-21 RX ADMIN — Medication 500 MILLIGRAM(S): at 10:54

## 2019-11-21 RX ADMIN — Medication 100 MILLIGRAM(S): at 07:33

## 2019-11-21 RX ADMIN — SODIUM CHLORIDE 125 MILLILITER(S): 9 INJECTION, SOLUTION INTRAVENOUS at 20:56

## 2019-11-22 LAB
BASOPHILS # BLD AUTO: 0.04 K/UL — SIGNIFICANT CHANGE UP (ref 0–0.2)
BASOPHILS NFR BLD AUTO: 0.3 % — SIGNIFICANT CHANGE UP (ref 0–2)
EOSINOPHIL # BLD AUTO: 0.04 K/UL — SIGNIFICANT CHANGE UP (ref 0–0.5)
EOSINOPHIL NFR BLD AUTO: 0.3 % — SIGNIFICANT CHANGE UP (ref 0–6)
HCT VFR BLD CALC: 31.5 % — LOW (ref 34.5–45)
HGB BLD-MCNC: 10.6 G/DL — LOW (ref 11.5–15.5)
IMM GRANULOCYTES NFR BLD AUTO: 0.4 % — SIGNIFICANT CHANGE UP (ref 0–1.5)
LYMPHOCYTES # BLD AUTO: 17.8 % — SIGNIFICANT CHANGE UP (ref 13–44)
LYMPHOCYTES # BLD AUTO: 2.36 K/UL — SIGNIFICANT CHANGE UP (ref 1–3.3)
MCHC RBC-ENTMCNC: 30.5 PG — SIGNIFICANT CHANGE UP (ref 27–34)
MCHC RBC-ENTMCNC: 33.7 GM/DL — SIGNIFICANT CHANGE UP (ref 32–36)
MCV RBC AUTO: 90.5 FL — SIGNIFICANT CHANGE UP (ref 80–100)
MONOCYTES # BLD AUTO: 0.96 K/UL — HIGH (ref 0–0.9)
MONOCYTES NFR BLD AUTO: 7.3 % — SIGNIFICANT CHANGE UP (ref 2–14)
NEUTROPHILS # BLD AUTO: 9.79 K/UL — HIGH (ref 1.8–7.4)
NEUTROPHILS NFR BLD AUTO: 73.9 % — SIGNIFICANT CHANGE UP (ref 43–77)
NRBC # BLD: 0 /100 WBCS — SIGNIFICANT CHANGE UP (ref 0–0)
PLATELET # BLD AUTO: 245 K/UL — SIGNIFICANT CHANGE UP (ref 150–400)
RBC # BLD: 3.48 M/UL — LOW (ref 3.8–5.2)
RBC # FLD: 13.2 % — SIGNIFICANT CHANGE UP (ref 10.3–14.5)
WBC # BLD: 13.24 K/UL — HIGH (ref 3.8–10.5)
WBC # FLD AUTO: 13.24 K/UL — HIGH (ref 3.8–10.5)

## 2019-11-22 RX ORDER — LABETALOL HCL 100 MG
300 TABLET ORAL EVERY 12 HOURS
Refills: 0 | Status: DISCONTINUED | OUTPATIENT
Start: 2019-11-22 | End: 2019-11-24

## 2019-11-22 RX ORDER — IBUPROFEN 200 MG
600 TABLET ORAL EVERY 6 HOURS
Refills: 0 | Status: DISCONTINUED | OUTPATIENT
Start: 2019-11-22 | End: 2019-11-25

## 2019-11-22 RX ADMIN — SIMETHICONE 80 MILLIGRAM(S): 80 TABLET, CHEWABLE ORAL at 21:22

## 2019-11-22 RX ADMIN — HEPARIN SODIUM 5000 UNIT(S): 5000 INJECTION INTRAVENOUS; SUBCUTANEOUS at 09:06

## 2019-11-22 RX ADMIN — Medication 975 MILLIGRAM(S): at 10:00

## 2019-11-22 RX ADMIN — Medication 975 MILLIGRAM(S): at 14:53

## 2019-11-22 RX ADMIN — Medication 30 MILLIGRAM(S): at 06:58

## 2019-11-22 RX ADMIN — Medication 600 MILLIGRAM(S): at 12:30

## 2019-11-22 RX ADMIN — Medication 500 MILLIGRAM(S): at 06:00

## 2019-11-22 RX ADMIN — Medication 600 MILLIGRAM(S): at 19:15

## 2019-11-22 RX ADMIN — HEPARIN SODIUM 5000 UNIT(S): 5000 INJECTION INTRAVENOUS; SUBCUTANEOUS at 21:23

## 2019-11-22 RX ADMIN — Medication 975 MILLIGRAM(S): at 08:59

## 2019-11-22 RX ADMIN — Medication 600 MILLIGRAM(S): at 00:25

## 2019-11-22 RX ADMIN — Medication 975 MILLIGRAM(S): at 22:22

## 2019-11-22 RX ADMIN — Medication 30 MILLIGRAM(S): at 06:00

## 2019-11-22 RX ADMIN — Medication 975 MILLIGRAM(S): at 15:30

## 2019-11-22 RX ADMIN — Medication 975 MILLIGRAM(S): at 21:22

## 2019-11-22 RX ADMIN — Medication 300 MILLIGRAM(S): at 18:14

## 2019-11-22 RX ADMIN — Medication 600 MILLIGRAM(S): at 18:15

## 2019-11-22 NOTE — LACTATION INITIAL EVALUATION - NS LACT CON REASON FOR REQ
primaparous mom/FT baby girl s/p  for failure to descend with lacerations and bruising on the head. + expressable colostrum bilaterally. educated on hand expression with teach back. baby showed s/s of hunger cues and latched in one assisted attempt in L cross cradle. special attention to head during hold as baby has visible lacerations and bruising due to delivery. voiding and stooling WNL. mom pmh of chronic HTN. educated on clusterfeeding and second day baby syndrome. infant feeding plan -- ad jacinta feeds with no more than 3 hours to elapse between the start of the feeds. reassurance provided, all questions answered, primary RN made aware.

## 2019-11-22 NOTE — PROGRESS NOTE ADULT - SUBJECTIVE AND OBJECTIVE BOX
pt doing well  ICU Vital Signs Last 24 Hrs  T(C): 36.6 (22 Nov 2019 09:05), Max: 37.1 (21 Nov 2019 14:15)  T(F): 97.9 (22 Nov 2019 09:05), Max: 98.7 (21 Nov 2019 14:15)  HR: 76 (22 Nov 2019 09:05) (70 - 80)  BP: 125/84 (22 Nov 2019 09:05) (110/74 - 155/85)    RR: 20 (22 Nov 2019 09:05) (16 - 20)  SpO2: 95% (22 Nov 2019 09:05) (95% - 99%)    abdomen soft and nd  incision c./d/i  neg calf tenderness    pod 1s/p c/s  doing well  passing gas  eating reg diet  oob  can shower

## 2019-11-23 RX ADMIN — Medication 600 MILLIGRAM(S): at 13:00

## 2019-11-23 RX ADMIN — Medication 975 MILLIGRAM(S): at 15:31

## 2019-11-23 RX ADMIN — HEPARIN SODIUM 5000 UNIT(S): 5000 INJECTION INTRAVENOUS; SUBCUTANEOUS at 21:19

## 2019-11-23 RX ADMIN — Medication 975 MILLIGRAM(S): at 03:59

## 2019-11-23 RX ADMIN — Medication 975 MILLIGRAM(S): at 10:30

## 2019-11-23 RX ADMIN — Medication 600 MILLIGRAM(S): at 18:19

## 2019-11-23 RX ADMIN — Medication 300 MILLIGRAM(S): at 18:05

## 2019-11-23 RX ADMIN — SIMETHICONE 80 MILLIGRAM(S): 80 TABLET, CHEWABLE ORAL at 06:26

## 2019-11-23 RX ADMIN — Medication 600 MILLIGRAM(S): at 18:05

## 2019-11-23 RX ADMIN — Medication 975 MILLIGRAM(S): at 03:08

## 2019-11-23 RX ADMIN — Medication 600 MILLIGRAM(S): at 00:00

## 2019-11-23 RX ADMIN — HEPARIN SODIUM 5000 UNIT(S): 5000 INJECTION INTRAVENOUS; SUBCUTANEOUS at 10:09

## 2019-11-23 RX ADMIN — Medication 600 MILLIGRAM(S): at 07:25

## 2019-11-23 RX ADMIN — Medication 600 MILLIGRAM(S): at 12:36

## 2019-11-23 RX ADMIN — Medication 975 MILLIGRAM(S): at 21:18

## 2019-11-23 RX ADMIN — Medication 300 MILLIGRAM(S): at 05:30

## 2019-11-23 RX ADMIN — Medication 975 MILLIGRAM(S): at 16:30

## 2019-11-23 RX ADMIN — Medication 975 MILLIGRAM(S): at 09:45

## 2019-11-23 RX ADMIN — Medication 600 MILLIGRAM(S): at 06:25

## 2019-11-23 RX ADMIN — Medication 975 MILLIGRAM(S): at 22:18

## 2019-11-23 RX ADMIN — Medication 600 MILLIGRAM(S): at 01:00

## 2019-11-23 NOTE — PROGRESS NOTE ADULT - SUBJECTIVE AND OBJECTIVE BOX
Patient evaluated at bedside.   She reports pain is well controlled with OPM.  She has been ambulating without assistance, voiding spontaneously, passing gas, tolerating regular diet and is breastfeeding.    She denies HA, dizziness, CP, palpitations, SOB, n/v, or heavy vaginal bleeding.    Physical Exam:  vss  Gen: NAD  Abd: + BS, soft, nontender, nondistended, no rebound or guarding  Incision clean, dry and intact  uterus firm at midline,   fb below umbilicus  : lochia WNL  Extremities: no swelling or calf tenderness  pod2 stable

## 2019-11-24 ENCOUNTER — TRANSCRIPTION ENCOUNTER (OUTPATIENT)
Age: 44
End: 2019-11-24

## 2019-11-24 LAB
ALBUMIN SERPL ELPH-MCNC: 3.1 G/DL — LOW (ref 3.3–5)
ALP SERPL-CCNC: 81 U/L — SIGNIFICANT CHANGE UP (ref 40–120)
ALT FLD-CCNC: 12 U/L — SIGNIFICANT CHANGE UP (ref 10–45)
ANION GAP SERPL CALC-SCNC: 9 MMOL/L — SIGNIFICANT CHANGE UP (ref 5–17)
APTT BLD: 27.7 SEC — SIGNIFICANT CHANGE UP (ref 27.5–36.3)
AST SERPL-CCNC: 17 U/L — SIGNIFICANT CHANGE UP (ref 10–40)
BASOPHILS # BLD AUTO: 0.03 K/UL — SIGNIFICANT CHANGE UP (ref 0–0.2)
BASOPHILS NFR BLD AUTO: 0.5 % — SIGNIFICANT CHANGE UP (ref 0–2)
BILIRUB SERPL-MCNC: 0.2 MG/DL — SIGNIFICANT CHANGE UP (ref 0.2–1.2)
BUN SERPL-MCNC: 10 MG/DL — SIGNIFICANT CHANGE UP (ref 7–23)
CALCIUM SERPL-MCNC: 9.1 MG/DL — SIGNIFICANT CHANGE UP (ref 8.4–10.5)
CHLORIDE SERPL-SCNC: 103 MMOL/L — SIGNIFICANT CHANGE UP (ref 96–108)
CO2 SERPL-SCNC: 24 MMOL/L — SIGNIFICANT CHANGE UP (ref 22–31)
CREAT SERPL-MCNC: 0.56 MG/DL — SIGNIFICANT CHANGE UP (ref 0.5–1.3)
EOSINOPHIL # BLD AUTO: 0.21 K/UL — SIGNIFICANT CHANGE UP (ref 0–0.5)
EOSINOPHIL NFR BLD AUTO: 3.4 % — SIGNIFICANT CHANGE UP (ref 0–6)
FIBRINOGEN PPP-MCNC: 584 MG/DL — HIGH (ref 258–438)
GLUCOSE SERPL-MCNC: 99 MG/DL — SIGNIFICANT CHANGE UP (ref 70–99)
HCT VFR BLD CALC: 33.6 % — LOW (ref 34.5–45)
HGB BLD-MCNC: 10.8 G/DL — LOW (ref 11.5–15.5)
IMM GRANULOCYTES NFR BLD AUTO: 0.2 % — SIGNIFICANT CHANGE UP (ref 0–1.5)
INR BLD: 0.96 — SIGNIFICANT CHANGE UP (ref 0.88–1.16)
LDH SERPL L TO P-CCNC: 188 U/L — SIGNIFICANT CHANGE UP (ref 50–242)
LYMPHOCYTES # BLD AUTO: 1.72 K/UL — SIGNIFICANT CHANGE UP (ref 1–3.3)
LYMPHOCYTES # BLD AUTO: 27.7 % — SIGNIFICANT CHANGE UP (ref 13–44)
MCHC RBC-ENTMCNC: 29.8 PG — SIGNIFICANT CHANGE UP (ref 27–34)
MCHC RBC-ENTMCNC: 32.1 GM/DL — SIGNIFICANT CHANGE UP (ref 32–36)
MCV RBC AUTO: 92.6 FL — SIGNIFICANT CHANGE UP (ref 80–100)
MONOCYTES # BLD AUTO: 0.37 K/UL — SIGNIFICANT CHANGE UP (ref 0–0.9)
MONOCYTES NFR BLD AUTO: 5.9 % — SIGNIFICANT CHANGE UP (ref 2–14)
NEUTROPHILS # BLD AUTO: 3.88 K/UL — SIGNIFICANT CHANGE UP (ref 1.8–7.4)
NEUTROPHILS NFR BLD AUTO: 62.3 % — SIGNIFICANT CHANGE UP (ref 43–77)
NRBC # BLD: 0 /100 WBCS — SIGNIFICANT CHANGE UP (ref 0–0)
PLATELET # BLD AUTO: 327 K/UL — SIGNIFICANT CHANGE UP (ref 150–400)
POTASSIUM SERPL-MCNC: 3.8 MMOL/L — SIGNIFICANT CHANGE UP (ref 3.5–5.3)
POTASSIUM SERPL-SCNC: 3.8 MMOL/L — SIGNIFICANT CHANGE UP (ref 3.5–5.3)
PROT SERPL-MCNC: 6 G/DL — SIGNIFICANT CHANGE UP (ref 6–8.3)
PROTHROM AB SERPL-ACNC: 10.8 SEC — SIGNIFICANT CHANGE UP (ref 10–12.9)
RBC # BLD: 3.63 M/UL — LOW (ref 3.8–5.2)
RBC # FLD: 12.8 % — SIGNIFICANT CHANGE UP (ref 10.3–14.5)
SODIUM SERPL-SCNC: 136 MMOL/L — SIGNIFICANT CHANGE UP (ref 135–145)
URATE SERPL-MCNC: 3.4 MG/DL — SIGNIFICANT CHANGE UP (ref 2.5–7)
WBC # BLD: 6.22 K/UL — SIGNIFICANT CHANGE UP (ref 3.8–10.5)
WBC # FLD AUTO: 6.22 K/UL — SIGNIFICANT CHANGE UP (ref 3.8–10.5)

## 2019-11-24 RX ORDER — ALBUTEROL 90 UG/1
0 AEROSOL, METERED ORAL
Qty: 0 | Refills: 0 | DISCHARGE

## 2019-11-24 RX ORDER — LABETALOL HCL 100 MG
200 TABLET ORAL ONCE
Refills: 0 | Status: COMPLETED | OUTPATIENT
Start: 2019-11-24 | End: 2019-11-24

## 2019-11-24 RX ORDER — NIFEDIPINE 30 MG
30 TABLET, EXTENDED RELEASE 24 HR ORAL ONCE
Refills: 0 | Status: COMPLETED | OUTPATIENT
Start: 2019-11-24 | End: 2019-11-24

## 2019-11-24 RX ORDER — LABETALOL HCL 100 MG
500 TABLET ORAL EVERY 12 HOURS
Refills: 0 | Status: DISCONTINUED | OUTPATIENT
Start: 2019-11-24 | End: 2019-11-25

## 2019-11-24 RX ORDER — NIFEDIPINE 30 MG
10 TABLET, EXTENDED RELEASE 24 HR ORAL ONCE
Refills: 0 | Status: COMPLETED | OUTPATIENT
Start: 2019-11-24 | End: 2019-11-24

## 2019-11-24 RX ORDER — LABETALOL HCL 100 MG
0 TABLET ORAL
Qty: 0 | Refills: 0 | DISCHARGE

## 2019-11-24 RX ADMIN — Medication 10 MILLIGRAM(S): at 15:26

## 2019-11-24 RX ADMIN — Medication 600 MILLIGRAM(S): at 07:05

## 2019-11-24 RX ADMIN — Medication 200 MILLIGRAM(S): at 09:26

## 2019-11-24 RX ADMIN — Medication 600 MILLIGRAM(S): at 13:00

## 2019-11-24 RX ADMIN — Medication 975 MILLIGRAM(S): at 16:45

## 2019-11-24 RX ADMIN — Medication 975 MILLIGRAM(S): at 22:00

## 2019-11-24 RX ADMIN — Medication 600 MILLIGRAM(S): at 00:07

## 2019-11-24 RX ADMIN — Medication 975 MILLIGRAM(S): at 10:20

## 2019-11-24 RX ADMIN — Medication 600 MILLIGRAM(S): at 19:36

## 2019-11-24 RX ADMIN — Medication 975 MILLIGRAM(S): at 02:57

## 2019-11-24 RX ADMIN — HEPARIN SODIUM 5000 UNIT(S): 5000 INJECTION INTRAVENOUS; SUBCUTANEOUS at 21:00

## 2019-11-24 RX ADMIN — SIMETHICONE 80 MILLIGRAM(S): 80 TABLET, CHEWABLE ORAL at 23:00

## 2019-11-24 RX ADMIN — Medication 600 MILLIGRAM(S): at 06:05

## 2019-11-24 RX ADMIN — Medication 300 MILLIGRAM(S): at 06:05

## 2019-11-24 RX ADMIN — Medication 975 MILLIGRAM(S): at 09:27

## 2019-11-24 RX ADMIN — Medication 975 MILLIGRAM(S): at 03:57

## 2019-11-24 RX ADMIN — Medication 500 MILLIGRAM(S): at 21:00

## 2019-11-24 RX ADMIN — Medication 975 MILLIGRAM(S): at 21:00

## 2019-11-24 RX ADMIN — Medication 600 MILLIGRAM(S): at 18:47

## 2019-11-24 RX ADMIN — Medication 600 MILLIGRAM(S): at 12:12

## 2019-11-24 RX ADMIN — Medication 975 MILLIGRAM(S): at 15:51

## 2019-11-24 RX ADMIN — Medication 600 MILLIGRAM(S): at 01:07

## 2019-11-24 RX ADMIN — Medication 30 MILLIGRAM(S): at 18:46

## 2019-11-24 NOTE — DISCHARGE NOTE OB - CARE PROVIDER_API CALL
19 y.o. female presents to ER ED 01/ED 01B   Chief Complaint   Patient presents with    Nasal Congestion     for 2 days   . No acute distress noted.  Currently breastfeeding   Floridalma Plascencia)  Obstetrics and Gynecology  [address] [place] [id number]  Phone: [phone]  Fax: [phone]  Follow Up Time:

## 2019-11-24 NOTE — DISCHARGE NOTE OB - CARE PLAN
Principal Discharge DX:	Postpartum state  Goal:	to feel well  Assessment and plan of treatment:	43y female s/p  section, meeting all postpartum milestones. No heavy lifting. Pelvic rest until cleared. Follow-up with obstetrician in 1-2 weeks.  Secondary Diagnosis:	Hypertension, unspecified type  Goal:	normal range blood pressure readings  Assessment and plan of treatment:	Monitor blood pressure twice daily. Document blood pressures to review with obstetrician at follow-up appointment. Return to hospital for systolic blood pressure (top number) equal to or greater than 160 AND/OR diastolic blood pressure (bottom number)equal to or greater than 110 OR any of the following symptoms: changes in vision, headache not relieved with Tylenol, severe abdominal pain, vomiting, increased vaginal bleeding, chest pain or shortness of breath. Call obstetrician for persistent systolic blood pressure (top number) equal to or greater than 150 AND/OR diastolic blood pressure (bottom number) equal to or greater than 100.

## 2019-11-24 NOTE — DISCHARGE NOTE OB - MEDICATION SUMMARY - MEDICATIONS TO TAKE
I will START or STAY ON the medications listed below when I get home from the hospital:  None I will START or STAY ON the medications listed below when I get home from the hospital:    labetalol 200 mg oral tablet  -- 1 tab(s) by mouth 2 times a day (total of 500mg BID)  -- It is very important that you take or use this exactly as directed.  Do not skip doses or discontinue unless directed by your doctor.  May cause drowsiness.  Alcohol may intensify this effect.  Use care when operating dangerous machinery.  Some non-prescription drugs may aggravate your condition.  Read all labels carefully.  If a warning appears, check with your doctor before taking.    -- Indication: For Hypertension, unspecified type    labetalol 300 mg oral tablet  -- 1 tab(s) by mouth 2 times a day (total of 500mg BID)  -- It is very important that you take or use this exactly as directed.  Do not skip doses or discontinue unless directed by your doctor.  May cause drowsiness.  Alcohol may intensify this effect.  Use care when operating dangerous machinery.  Some non-prescription drugs may aggravate your condition.  Read all labels carefully.  If a warning appears, check with your doctor before taking.    -- Indication: For Hypertension, unspecified type    NIFEdipine 30 mg oral tablet, extended release  -- 1 tab(s) by mouth once a day   -- Avoid grapefruit and grapefruit juice while taking this medication.  It is very important that you take or use this exactly as directed.  Do not skip doses or discontinue unless directed by your doctor.  Some non-prescription drugs may aggravate your condition.  Read all labels carefully.  If a warning appears, check with your doctor before taking.  Swallow whole.  Do not crush.    -- Indication: For Hypertension, unspecified type

## 2019-11-24 NOTE — PROGRESS NOTE ADULT - SUBJECTIVE AND OBJECTIVE BOX
Patient evaluated at bedside this morning, resting comfortable in bed, no acute events overnight.  She reports pain is well controlled with tylenol and motrin.  She denies headache, dizziness, chest pain, palpitations, shortness of breath, nausea, vomiting, heavy vaginal bleeding or perineal discomfort. Reports decrease in amount of vaginal bleeding and denies clots.  She has been ambulating without assistance, voiding spontaneously.  Tolerating food well, without nausea/vomit.      Physical Exam:  T(C): 36.8 (11-24-19 @ 06:11), Max: 36.8 (11-24-19 @ 06:11)  HR: 74 (11-24-19 @ 07:26) (73 - 78)  BP: 139/85 (11-24-19 @ 07:26) (136/88 - 161/92)  RR: 18 (11-24-19 @ 06:11) (18 - 18)  SpO2: 95% (11-24-19 @ 06:11) (95% - 96%)    GA: NAD, A&O x 3  Abd: + BS, soft, nontender, nondistended, no rebound or guarding, uterus firm.  Lungs: CTA b/l  Extremities: no swelling or calf tenderness, Reflexes +1  Perineum: lochia less than menses, intact, healing well, no hematoma        acetaminophen   Tablet .. 975 milliGRAM(s) Oral <User Schedule>  diphenhydrAMINE 25 milliGRAM(s) Oral every 6 hours PRN  diphtheria/tetanus/pertussis (acellular) Vaccine (ADAcel) 0.5 milliLiter(s) IntraMuscular once  glycerin Suppository - Adult 1 Suppository(s) Rectal at bedtime PRN  heparin  Injectable 5000 Unit(s) SubCutaneous every 12 hours  ibuprofen  Tablet. 600 milliGRAM(s) Oral every 6 hours  labetalol 500 milliGRAM(s) Oral every 12 hours  lactated ringers. 1000 milliLiter(s) IV Continuous <Continuous>  lanolin Ointment 1 Application(s) Topical every 6 hours PRN  magnesium hydroxide Suspension 30 milliLiter(s) Oral two times a day PRN  ondansetron Injectable 4 milliGRAM(s) IV Push every 6 hours PRN  oxyCODONE    IR 5 milliGRAM(s) Oral every 3 hours PRN  oxyCODONE    IR 5 milliGRAM(s) Oral once PRN  oxytocin Infusion 333.333 milliUNIT(s)/Min IV Continuous <Continuous>  simethicone 80 milliGRAM(s) Chew every 4 hours PRN

## 2019-11-24 NOTE — DISCHARGE NOTE OB - PATIENT PORTAL LINK FT
You can access the FollowMyHealth Patient Portal offered by [facility] by registering at the following website: [email or web address]. By joining [employer]’s FollowMyHealth portal, you will also be able to view your health information using other applications (apps) compatible with our system.

## 2019-11-24 NOTE — DISCHARGE NOTE OB - PLAN OF CARE
to feel well 43y female s/p  section, meeting all postpartum milestones. No heavy lifting. Pelvic rest until cleared. Follow-up with obstetrician in 1-2 weeks. normal range blood pressure readings Monitor blood pressure twice daily. Document blood pressures to review with obstetrician at follow-up appointment. Return to hospital for systolic blood pressure (top number) equal to or greater than 160 AND/OR diastolic blood pressure (bottom number)equal to or greater than 110 OR any of the following symptoms: changes in vision, headache not relieved with Tylenol, severe abdominal pain, vomiting, increased vaginal bleeding, chest pain or shortness of breath. Call obstetrician for persistent systolic blood pressure (top number) equal to or greater than 150 AND/OR diastolic blood pressure (bottom number) equal to or greater than 100.

## 2019-11-24 NOTE — DISCHARGE NOTE OB - HOSPITAL COURSE
44yo  at 37w0d presented for IOL for cHTN, however NRFHT and patient underwent primary c/s. Uncomplicated postpartum course. 42yo  at 37w0d presented for IOL for cHTN, however NRFHT and patient underwent primary c/s. Uncomplicated postpartum course. Medication adjusted for cHTN, bp monitoring instructions for home given. Denies any toxic symptoms. 44yo  at 37w0d presented for IOL for cHTN, however NRFHT and patient underwent primary c/s. Uncomplicated postpartum course. Medication adjusted for cHTN, bp monitoring instructions for home given. Denies any toxic symptoms.   Please follow up with Dr Alfredo in 1-2 weeks at [phone].

## 2019-11-24 NOTE — PROGRESS NOTE ADULT - SUBJECTIVE AND OBJECTIVE BOX
Patient evaluated at bedside.   She reports pain is well controlled with OPM.  She has been ambulating without assistance, voiding spontaneously, passing gas, tolerating regular diet and is breastfeeding.    She denies HA, dizziness, CP, palpitations, SOB, n/v, or heavy vaginal bleeding.    Physical Exam:  vs reviewed  Gen: NAD  Abd: + BS, soft, nontender, nondistended, no rebound or guarding  Incision clean, dry and intact  uterus firm at midline,   fb below umbilicus  : lochia WNL  Extremities: no swelling or calf tenderness    pod3 stable  bp monitoring at home  resume bp med

## 2019-11-24 NOTE — CHART NOTE - NSCHARTNOTEFT_GEN_A_CORE
Provider to bedside for severely elevated BP of 162/94  repeated 15 min later at 146/86 repeated 15 min later at 160/91.  Administering PO nifedipine 10 mg due to lack of IV access.    Patient denies HA, vision changes, abd pain, chest pain, SOB.  Pt is upset due to lack of sleep and cluster feeding.    PE:   Gen: no acute distress  Abd: nontender, nondistended, fundus firm and below umbilicus  CV: RRR, no MRG  Pulm: CTAB, symm chest rise  Ext: bilateral reflexes 2+    A&P:   Severe range BP x2.  Dr. Miranda notified.  Oral nifedipine.  - Recheck BP in 20 min.  - On labetalol 500mg BID

## 2019-11-24 NOTE — PROGRESS NOTE ADULT - ASSESSMENT
A/P: 43y s/p  section, POD#3, stable  - cHTN: on Labetalol 300 mg BID, however is having consistent elevated BPs- Medication dose will be readjusted.   -  Pain: PO motrin q6hrs, tylenol q8hrs, oxycodone for severe pain PRN  -  Post-operatively labs: Hemodynamically stable, no symptoms of anemia   -  GI: tolerating regular diet, passing gas, colace PRN  -  : s/p alfred , urinating without difficulty  -  DVT prophylaxis: encouraged increased ambulation, SCDs, SQH  -  Dispo: depending BPs

## 2019-11-25 VITALS
SYSTOLIC BLOOD PRESSURE: 143 MMHG | RESPIRATION RATE: 20 BRPM | OXYGEN SATURATION: 98 % | HEART RATE: 80 BPM | TEMPERATURE: 98 F | DIASTOLIC BLOOD PRESSURE: 91 MMHG

## 2019-11-25 LAB
ALBUMIN SERPL ELPH-MCNC: 3.3 G/DL — SIGNIFICANT CHANGE UP (ref 3.3–5)
ALP SERPL-CCNC: 87 U/L — SIGNIFICANT CHANGE UP (ref 40–120)
ALT FLD-CCNC: 15 U/L — SIGNIFICANT CHANGE UP (ref 10–45)
ANION GAP SERPL CALC-SCNC: 10 MMOL/L — SIGNIFICANT CHANGE UP (ref 5–17)
AST SERPL-CCNC: 17 U/L — SIGNIFICANT CHANGE UP (ref 10–40)
BILIRUB SERPL-MCNC: 0.3 MG/DL — SIGNIFICANT CHANGE UP (ref 0.2–1.2)
BUN SERPL-MCNC: 8 MG/DL — SIGNIFICANT CHANGE UP (ref 7–23)
CALCIUM SERPL-MCNC: 9.1 MG/DL — SIGNIFICANT CHANGE UP (ref 8.4–10.5)
CHLORIDE SERPL-SCNC: 105 MMOL/L — SIGNIFICANT CHANGE UP (ref 96–108)
CO2 SERPL-SCNC: 25 MMOL/L — SIGNIFICANT CHANGE UP (ref 22–31)
CREAT SERPL-MCNC: 0.57 MG/DL — SIGNIFICANT CHANGE UP (ref 0.5–1.3)
GLUCOSE SERPL-MCNC: 87 MG/DL — SIGNIFICANT CHANGE UP (ref 70–99)
HCT VFR BLD CALC: 38 % — SIGNIFICANT CHANGE UP (ref 34.5–45)
HGB BLD-MCNC: 12 G/DL — SIGNIFICANT CHANGE UP (ref 11.5–15.5)
MCHC RBC-ENTMCNC: 29.5 PG — SIGNIFICANT CHANGE UP (ref 27–34)
MCHC RBC-ENTMCNC: 31.6 GM/DL — LOW (ref 32–36)
MCV RBC AUTO: 93.4 FL — SIGNIFICANT CHANGE UP (ref 80–100)
NRBC # BLD: 0 /100 WBCS — SIGNIFICANT CHANGE UP (ref 0–0)
PLATELET # BLD AUTO: 340 K/UL — SIGNIFICANT CHANGE UP (ref 150–400)
POTASSIUM SERPL-MCNC: 4.1 MMOL/L — SIGNIFICANT CHANGE UP (ref 3.5–5.3)
POTASSIUM SERPL-SCNC: 4.1 MMOL/L — SIGNIFICANT CHANGE UP (ref 3.5–5.3)
PROT SERPL-MCNC: 6.6 G/DL — SIGNIFICANT CHANGE UP (ref 6–8.3)
RBC # BLD: 4.07 M/UL — SIGNIFICANT CHANGE UP (ref 3.8–5.2)
RBC # FLD: 12.6 % — SIGNIFICANT CHANGE UP (ref 10.3–14.5)
SODIUM SERPL-SCNC: 140 MMOL/L — SIGNIFICANT CHANGE UP (ref 135–145)
WBC # BLD: 5.86 K/UL — SIGNIFICANT CHANGE UP (ref 3.8–10.5)
WBC # FLD AUTO: 5.86 K/UL — SIGNIFICANT CHANGE UP (ref 3.8–10.5)

## 2019-11-25 PROCEDURE — C1765: CPT

## 2019-11-25 PROCEDURE — 83615 LACTATE (LD) (LDH) ENZYME: CPT

## 2019-11-25 PROCEDURE — 80053 COMPREHEN METABOLIC PANEL: CPT

## 2019-11-25 PROCEDURE — 85384 FIBRINOGEN ACTIVITY: CPT

## 2019-11-25 PROCEDURE — 85027 COMPLETE CBC AUTOMATED: CPT

## 2019-11-25 PROCEDURE — 86850 RBC ANTIBODY SCREEN: CPT

## 2019-11-25 PROCEDURE — 85610 PROTHROMBIN TIME: CPT

## 2019-11-25 PROCEDURE — 36415 COLL VENOUS BLD VENIPUNCTURE: CPT

## 2019-11-25 PROCEDURE — 84156 ASSAY OF PROTEIN URINE: CPT

## 2019-11-25 PROCEDURE — 85025 COMPLETE CBC W/AUTO DIFF WBC: CPT

## 2019-11-25 PROCEDURE — 86780 TREPONEMA PALLIDUM: CPT

## 2019-11-25 PROCEDURE — 86901 BLOOD TYPING SEROLOGIC RH(D): CPT

## 2019-11-25 PROCEDURE — 86900 BLOOD TYPING SEROLOGIC ABO: CPT

## 2019-11-25 PROCEDURE — 82570 ASSAY OF URINE CREATININE: CPT

## 2019-11-25 PROCEDURE — 84550 ASSAY OF BLOOD/URIC ACID: CPT

## 2019-11-25 PROCEDURE — 85730 THROMBOPLASTIN TIME PARTIAL: CPT

## 2019-11-25 PROCEDURE — 88307 TISSUE EXAM BY PATHOLOGIST: CPT

## 2019-11-25 RX ORDER — LABETALOL HCL 100 MG
1 TABLET ORAL
Qty: 60 | Refills: 0
Start: 2019-11-25 | End: 2019-12-24

## 2019-11-25 RX ORDER — NIFEDIPINE 30 MG
1 TABLET, EXTENDED RELEASE 24 HR ORAL
Qty: 30 | Refills: 0
Start: 2019-11-25 | End: 2019-12-24

## 2019-11-25 RX ADMIN — Medication 500 MILLIGRAM(S): at 09:38

## 2019-11-25 RX ADMIN — Medication 600 MILLIGRAM(S): at 06:33

## 2019-11-25 RX ADMIN — Medication 975 MILLIGRAM(S): at 14:29

## 2019-11-25 RX ADMIN — Medication 600 MILLIGRAM(S): at 00:19

## 2019-11-25 RX ADMIN — HEPARIN SODIUM 5000 UNIT(S): 5000 INJECTION INTRAVENOUS; SUBCUTANEOUS at 09:40

## 2019-11-25 RX ADMIN — Medication 975 MILLIGRAM(S): at 03:35

## 2019-11-25 RX ADMIN — Medication 975 MILLIGRAM(S): at 04:05

## 2019-11-25 RX ADMIN — Medication 600 MILLIGRAM(S): at 07:33

## 2019-11-25 RX ADMIN — Medication 600 MILLIGRAM(S): at 01:10

## 2019-11-25 RX ADMIN — Medication 600 MILLIGRAM(S): at 11:40

## 2019-11-25 RX ADMIN — Medication 600 MILLIGRAM(S): at 17:38

## 2019-11-25 RX ADMIN — Medication 975 MILLIGRAM(S): at 09:40

## 2019-11-25 RX ADMIN — SIMETHICONE 80 MILLIGRAM(S): 80 TABLET, CHEWABLE ORAL at 03:36

## 2019-11-25 NOTE — DIETITIAN INITIAL EVALUATION ADULT. - ENERGY NEEDS
Ht: 71" Pre-pregnancy wt: 224lbs IBW: 155lbs (+/-10%), 144%IBW, BMI: 31.2 kg/m2   IBW (70.3 kg) used for calculations as pt w/ pre-pregnancy wt >120% of IBW. Nutrient needs based on [facility] standards of care for adults. Needs adjusted for lactation.     (25-30 kcal/kg)+500kcal: 3353-0094 kcal/day  71 g protein/day, (35-40 ml/kg): 0301-7021 ml/day

## 2019-11-25 NOTE — DIETITIAN INITIAL EVALUATION ADULT. - PERTINENT MEDS FT
labetalol, glycerin suppository, magnesium hydroxide suspension, ondansetron injectable, simethicone

## 2019-11-25 NOTE — PROGRESS NOTE ADULT - ASSESSMENT
A/P  43y P1 s/p primary c/s with exacerbation of chronic htn, now stable with normal to mild BPs, asx POD # 4 ,stable    Diet: regular diet  HTN: continue Labetalol 500mg q12h, Nifedipine 30mg XL, pt will follow up with her cardiologist in 1 wk post d/c  Pain: OPM  IV fluid: PO hydration  OOB/SCDs/IS  Continue to monitor  Anticipate discharge POD #4 or #5 pending stable BPs, has follow up appt in office on Wednesday, discussed PEC sx and checking BPs daily

## 2019-11-25 NOTE — DIETITIAN INITIAL EVALUATION ADULT. - RD TO REMAIN AVAILABLE
1547: Pt's HR often bradys down to high 30s while sleeping and comes back up with arousal, Dr Ash Morelos made aware. 1730: Pt's bedrest has ended, pt got up to side of bed and took a couple steps in the room, he is not very mobile at baseline. Pt tolerated well, site CDI. Discharge info reviewed with pt and his daughter, who verbalized understanding. All questions answered. Pt discharged safely via wheelchair. yes/Moderate Risk

## 2019-11-25 NOTE — DIETITIAN INITIAL EVALUATION ADULT. - ADD RECOMMEND
1) Recommend continue with regular diet. 2) Encourage PO intake and honor pt's food preferences. 3) Monitor lytes and replete prn. 4) Obtain and monitor wt trends.

## 2019-11-25 NOTE — DIETITIAN INITIAL EVALUATION ADULT. - OTHER INFO
Pt is a 43 y.o F s/p c/s POD#4, with exacerbation of chronic HTN, now stable with normal to mild BPs.     Pt seen resting in bed, pleasant. Pt reports good appetite and PO intake PTA, usually consuming small/frequent meals, protein shakes for breakfast, tries to follow a low-sodium diet PTA. Denies chewing/swallowing difficulties. Confirms NKFA. Pt states pre-pregnancy wt of 224lbs, now noted w/ 231lbs post-delivery. Pt endorses good appetite at present, consuming >75% of meals. Denies N/V/D/C. +BM 11/25. No pain reported at this time. Skin: Edema 1+ b/l ankle; surgical incision noted. Pt reports planning on breastfeeding. Discussed increased needs w/ lactation. Pt appeared receptive. Answered all relevant nutrition-related questions. RD to f/u per protocol.

## 2019-11-26 LAB — SURGICAL PATHOLOGY STUDY: SIGNIFICANT CHANGE UP

## 2019-11-29 DIAGNOSIS — Z34.83 ENCOUNTER FOR SUPERVISION OF OTHER NORMAL PREGNANCY, THIRD TRIMESTER: ICD-10-CM

## 2019-11-29 DIAGNOSIS — Z3A.37 37 WEEKS GESTATION OF PREGNANCY: ICD-10-CM

## 2019-11-29 DIAGNOSIS — O98.513 OTHER VIRAL DISEASES COMPLICATING PREGNANCY, THIRD TRIMESTER: ICD-10-CM

## 2021-11-03 ENCOUNTER — NON-APPOINTMENT (OUTPATIENT)
Age: 46
End: 2021-11-03

## 2021-11-03 ENCOUNTER — APPOINTMENT (OUTPATIENT)
Dept: CARDIOLOGY | Facility: CLINIC | Age: 46
End: 2021-11-03
Payer: COMMERCIAL

## 2021-11-03 VITALS
WEIGHT: 210 LBS | SYSTOLIC BLOOD PRESSURE: 110 MMHG | OXYGEN SATURATION: 99 % | HEART RATE: 74 BPM | BODY MASS INDEX: 29.4 KG/M2 | DIASTOLIC BLOOD PRESSURE: 80 MMHG | HEIGHT: 71 IN

## 2021-11-03 PROCEDURE — 93000 ELECTROCARDIOGRAM COMPLETE: CPT

## 2021-11-03 PROCEDURE — 99204 OFFICE O/P NEW MOD 45 MIN: CPT

## 2022-01-31 ENCOUNTER — APPOINTMENT (OUTPATIENT)
Dept: INTERNAL MEDICINE | Facility: CLINIC | Age: 47
End: 2022-01-31
Payer: COMMERCIAL

## 2022-02-12 ENCOUNTER — APPOINTMENT (OUTPATIENT)
Dept: INTERNAL MEDICINE | Facility: CLINIC | Age: 47
End: 2022-02-12
Payer: COMMERCIAL

## 2022-02-12 VITALS
TEMPERATURE: 97.4 F | DIASTOLIC BLOOD PRESSURE: 80 MMHG | BODY MASS INDEX: 31.36 KG/M2 | OXYGEN SATURATION: 97 % | HEART RATE: 80 BPM | RESPIRATION RATE: 16 BRPM | HEIGHT: 71 IN | SYSTOLIC BLOOD PRESSURE: 112 MMHG | WEIGHT: 224 LBS

## 2022-02-12 DIAGNOSIS — Z83.3 FAMILY HISTORY OF DIABETES MELLITUS: ICD-10-CM

## 2022-02-12 DIAGNOSIS — R51.9 HEADACHE, UNSPECIFIED: ICD-10-CM

## 2022-02-12 DIAGNOSIS — Z00.00 ENCOUNTER FOR GENERAL ADULT MEDICAL EXAMINATION W/OUT ABNORMAL FINDINGS: ICD-10-CM

## 2022-02-12 PROCEDURE — 99214 OFFICE O/P EST MOD 30 MIN: CPT | Mod: 25

## 2022-02-12 PROCEDURE — G0444 DEPRESSION SCREEN ANNUAL: CPT | Mod: 59

## 2022-02-12 PROCEDURE — 99386 PREV VISIT NEW AGE 40-64: CPT | Mod: 25

## 2022-02-12 PROCEDURE — G0442 ANNUAL ALCOHOL SCREEN 15 MIN: CPT

## 2022-02-21 LAB
25(OH)D3 SERPL-MCNC: 32.6 NG/ML
ALBUMIN SERPL ELPH-MCNC: 4.8 G/DL
ALP BLD-CCNC: 78 U/L
ALT SERPL-CCNC: 24 U/L
ANION GAP SERPL CALC-SCNC: 13 MMOL/L
APPEARANCE: CLEAR
AST SERPL-CCNC: 17 U/L
BACTERIA: NEGATIVE
BASOPHILS # BLD AUTO: 0.04 K/UL
BASOPHILS NFR BLD AUTO: 0.5 %
BILIRUB SERPL-MCNC: 0.5 MG/DL
BILIRUBIN URINE: NEGATIVE
BLOOD URINE: NEGATIVE
BUN SERPL-MCNC: 13 MG/DL
CALCIUM OXALATE CRYSTALS: ABNORMAL
CALCIUM SERPL-MCNC: 10.2 MG/DL
CHLORIDE SERPL-SCNC: 107 MMOL/L
CHOLEST SERPL-MCNC: 252 MG/DL
CO2 SERPL-SCNC: 20 MMOL/L
COLOR: YELLOW
CREAT SERPL-MCNC: 0.67 MG/DL
EOSINOPHIL # BLD AUTO: 0.13 K/UL
EOSINOPHIL NFR BLD AUTO: 1.8 %
ESTIMATED AVERAGE GLUCOSE: 108 MG/DL
FOLATE SERPL-MCNC: 15.6 NG/ML
GLUCOSE QUALITATIVE U: NEGATIVE
GLUCOSE SERPL-MCNC: 97 MG/DL
HBA1C MFR BLD HPLC: 5.4 %
HCT VFR BLD CALC: 42.1 %
HDLC SERPL-MCNC: 65 MG/DL
HGB BLD-MCNC: 13.4 G/DL
HYALINE CASTS: 1 /LPF
IMM GRANULOCYTES NFR BLD AUTO: 0.1 %
KETONES URINE: NEGATIVE
LDLC SERPL CALC-MCNC: 174 MG/DL
LEUKOCYTE ESTERASE URINE: NEGATIVE
LYMPHOCYTES # BLD AUTO: 1.99 K/UL
LYMPHOCYTES NFR BLD AUTO: 26.9 %
MAN DIFF?: NORMAL
MCHC RBC-ENTMCNC: 29.3 PG
MCHC RBC-ENTMCNC: 31.8 GM/DL
MCV RBC AUTO: 92.1 FL
MICROSCOPIC-UA: NORMAL
MONOCYTES # BLD AUTO: 0.49 K/UL
MONOCYTES NFR BLD AUTO: 6.6 %
NEUTROPHILS # BLD AUTO: 4.73 K/UL
NEUTROPHILS NFR BLD AUTO: 64.1 %
NITRITE URINE: NEGATIVE
NONHDLC SERPL-MCNC: 188 MG/DL
PH URINE: 6
PLATELET # BLD AUTO: 320 K/UL
POTASSIUM SERPL-SCNC: 4.6 MMOL/L
PROT SERPL-MCNC: 7.2 G/DL
PROTEIN URINE: NORMAL
RBC # BLD: 4.57 M/UL
RBC # FLD: 12.4 %
RED BLOOD CELLS URINE: 3 /HPF
SODIUM SERPL-SCNC: 139 MMOL/L
SPECIFIC GRAVITY URINE: 1.03
SQUAMOUS EPITHELIAL CELLS: 2 /HPF
TRIGL SERPL-MCNC: 69 MG/DL
TSH SERPL-ACNC: 1.79 UIU/ML
URINE COMMENTS: NORMAL
UROBILINOGEN URINE: NORMAL
VIT B12 SERPL-MCNC: 596 PG/ML
WBC # FLD AUTO: 7.39 K/UL
WHITE BLOOD CELLS URINE: 2 /HPF

## 2022-04-18 ENCOUNTER — RESULT REVIEW (OUTPATIENT)
Age: 47
End: 2022-04-18

## 2022-04-19 ENCOUNTER — APPOINTMENT (OUTPATIENT)
Dept: INTERNAL MEDICINE | Facility: CLINIC | Age: 47
End: 2022-04-19
Payer: COMMERCIAL

## 2022-04-19 PROCEDURE — 36415 COLL VENOUS BLD VENIPUNCTURE: CPT

## 2022-05-09 LAB
CHOLEST SERPL-MCNC: 265 MG/DL
HDLC SERPL-MCNC: 54 MG/DL
LDLC SERPL CALC-MCNC: 189 MG/DL
NONHDLC SERPL-MCNC: 211 MG/DL
TRIGL SERPL-MCNC: 110 MG/DL

## 2022-06-29 ENCOUNTER — APPOINTMENT (OUTPATIENT)
Dept: NEUROLOGY | Facility: CLINIC | Age: 47
End: 2022-06-29

## 2022-06-29 VITALS
OXYGEN SATURATION: 98 % | HEART RATE: 69 BPM | WEIGHT: 222 LBS | DIASTOLIC BLOOD PRESSURE: 79 MMHG | TEMPERATURE: 97.8 F | HEIGHT: 71 IN | SYSTOLIC BLOOD PRESSURE: 122 MMHG | BODY MASS INDEX: 31.08 KG/M2

## 2022-06-29 DIAGNOSIS — R06.83 SNORING: ICD-10-CM

## 2022-06-29 DIAGNOSIS — G43.719 CHRONIC MIGRAINE W/OUT AURA, INTRACTABLE, W/OUT STATUS MIGRAINOSUS: ICD-10-CM

## 2022-06-29 PROCEDURE — 99205 OFFICE O/P NEW HI 60 MIN: CPT

## 2022-06-29 RX ORDER — PROGESTERONE 50 MG/ML
50 INJECTION, SOLUTION INTRAMUSCULAR
Qty: 2 | Refills: 4 | Status: DISCONTINUED | COMMUNITY
Start: 2018-05-03 | End: 2022-06-29

## 2022-06-29 RX ORDER — SYRINGE W-NEEDLE,DISPOSAB,3 ML 25GX5/8"
22G X 1-1/2" SYRINGE, EMPTY DISPOSABLE MISCELLANEOUS
Qty: 20 | Refills: 2 | Status: DISCONTINUED | COMMUNITY
Start: 2017-12-07 | End: 2022-06-29

## 2022-06-29 RX ORDER — MENOTROPINS 75 UNIT
75 KIT SUBCUTANEOUS
Qty: 25 | Refills: 2 | Status: DISCONTINUED | COMMUNITY
Start: 2018-03-26 | End: 2022-06-29

## 2022-06-29 RX ORDER — CHORIOGONADOTROPIN ALFA 10000 UNIT
10000 KIT INTRAMUSCULAR
Qty: 1 | Refills: 0 | Status: DISCONTINUED | COMMUNITY
Start: 2018-10-24 | End: 2022-06-29

## 2022-06-29 RX ORDER — MENOTROPINS 75 UNIT
75 KIT SUBCUTANEOUS
Qty: 20 | Refills: 2 | Status: DISCONTINUED | COMMUNITY
Start: 2017-06-27 | End: 2022-06-29

## 2022-06-29 RX ORDER — CONTAINER,EMPTY
EACH MISCELLANEOUS
Qty: 1 | Refills: 0 | Status: DISCONTINUED | COMMUNITY
Start: 2019-03-22 | End: 2022-06-29

## 2022-06-29 RX ORDER — FOLLITROPIN 900 [IU]/1.5ML
900 INJECTION, SOLUTION SUBCUTANEOUS
Qty: 9 | Refills: 2 | Status: DISCONTINUED | COMMUNITY
Start: 2017-12-07 | End: 2022-06-29

## 2022-06-29 RX ORDER — SYRINGE W-NEEDLE,DISPOSAB,3 ML 25GX5/8"
22G X 1-1/2" SYRINGE, EMPTY DISPOSABLE MISCELLANEOUS
Qty: 20 | Refills: 1 | Status: DISCONTINUED | COMMUNITY
Start: 2018-10-24 | End: 2022-06-29

## 2022-06-29 RX ORDER — GONADOTROPHIN, CHORIONIC 10000 UNIT
10000 KIT INTRAMUSCULAR
Qty: 1 | Refills: 0 | Status: DISCONTINUED | COMMUNITY
Start: 2017-12-07 | End: 2022-06-29

## 2022-06-29 RX ORDER — FOLLITROPIN 900 [IU]/1.5ML
900 INJECTION, SOLUTION SUBCUTANEOUS
Qty: 4 | Refills: 2 | Status: DISCONTINUED | COMMUNITY
Start: 2018-03-26 | End: 2022-06-29

## 2022-06-29 RX ORDER — MENOTROPINS 75 UNIT
75 KIT SUBCUTANEOUS
Qty: 20 | Refills: 2 | Status: DISCONTINUED | COMMUNITY
Start: 2018-01-05 | End: 2022-06-29

## 2022-06-29 RX ORDER — SYRINGE W-NEEDLE,DISPOSAB,3 ML 25GX5/8"
22G X 1-1/2" SYRINGE, EMPTY DISPOSABLE MISCELLANEOUS
Qty: 20 | Refills: 2 | Status: DISCONTINUED | COMMUNITY
Start: 2017-06-27 | End: 2022-06-29

## 2022-06-29 RX ORDER — PROGESTERONE 50 MG/ML
50 INJECTION, SOLUTION INTRAMUSCULAR
Qty: 2 | Refills: 2 | Status: DISCONTINUED | COMMUNITY
Start: 2019-03-22 | End: 2022-06-29

## 2022-06-29 RX ORDER — SYRINGE W-NEEDLE,DISPOSAB,3 ML 25GX5/8"
22G X 1-1/2" SYRINGE, EMPTY DISPOSABLE MISCELLANEOUS
Qty: 20 | Refills: 1 | Status: DISCONTINUED | COMMUNITY
Start: 2018-09-18 | End: 2022-06-29

## 2022-06-29 RX ORDER — CHORIOGONADOTROPIN ALFA 250 UG/.5ML
250 INJECTION, SOLUTION SUBCUTANEOUS
Qty: 1 | Refills: 0 | Status: DISCONTINUED | COMMUNITY
Start: 2019-02-20 | End: 2022-06-29

## 2022-06-29 RX ORDER — METHYLPREDNISOLONE 8 MG/1
8 TABLET ORAL
Qty: 8 | Refills: 0 | Status: DISCONTINUED | COMMUNITY
Start: 2018-11-13 | End: 2022-06-29

## 2022-06-29 RX ORDER — PROGESTERONE 50 MG/ML
50 INJECTION, SOLUTION INTRAMUSCULAR
Qty: 3 | Refills: 3 | Status: DISCONTINUED | COMMUNITY
Start: 2019-04-08 | End: 2022-06-29

## 2022-06-29 RX ORDER — CONTAINER,EMPTY
EACH MISCELLANEOUS
Qty: 1 | Refills: 0 | Status: DISCONTINUED | COMMUNITY
Start: 2018-09-18 | End: 2022-06-29

## 2022-06-29 RX ORDER — SYRINGE W-NEEDLE,DISPOSAB,3 ML 25GX5/8"
22G X 1-1/2" SYRINGE, EMPTY DISPOSABLE MISCELLANEOUS
Qty: 20 | Refills: 1 | Status: DISCONTINUED | COMMUNITY
Start: 2018-03-26 | End: 2022-06-29

## 2022-06-29 RX ORDER — CHORIOGONADOTROPIN ALFA 10000 UNIT
10000 KIT INTRAMUSCULAR
Qty: 1 | Refills: 0 | Status: DISCONTINUED | COMMUNITY
Start: 2018-04-09 | End: 2022-06-29

## 2022-06-29 RX ORDER — CHORIOGONADOTROPIN ALFA 10000 UNIT
10000 KIT INTRAMUSCULAR
Qty: 1 | Refills: 0 | Status: DISCONTINUED | COMMUNITY
Start: 2018-09-18 | End: 2022-06-29

## 2022-06-29 RX ORDER — SYRINGE W-NEEDLE,DISPOSAB,3 ML 25GX5/8"
22G X 1-1/2" SYRINGE, EMPTY DISPOSABLE MISCELLANEOUS
Qty: 30 | Refills: 4 | Status: DISCONTINUED | COMMUNITY
Start: 2019-03-22 | End: 2022-06-29

## 2022-06-29 RX ORDER — ESTRADIOL 2 MG/1
2 TABLET ORAL
Qty: 21 | Refills: 3 | Status: DISCONTINUED | COMMUNITY
Start: 2018-04-09 | End: 2022-06-29

## 2022-06-29 RX ORDER — MENOTROPINS 75 UNIT
75 KIT SUBCUTANEOUS
Qty: 25 | Refills: 2 | Status: DISCONTINUED | COMMUNITY
Start: 2018-10-24 | End: 2022-06-29

## 2022-06-29 RX ORDER — MENOTROPINS 75 UNIT
75 KIT SUBCUTANEOUS
Qty: 30 | Refills: 0 | Status: DISCONTINUED | COMMUNITY
Start: 2017-12-07 | End: 2022-06-29

## 2022-06-29 RX ORDER — MENOTROPINS 75 UNIT
75 KIT SUBCUTANEOUS
Qty: 25 | Refills: 2 | Status: DISCONTINUED | COMMUNITY
Start: 2018-09-18 | End: 2022-06-29

## 2022-06-29 RX ORDER — ESTRADIOL 2 MG/1
2 TABLET ORAL
Qty: 90 | Refills: 4 | Status: DISCONTINUED | COMMUNITY
Start: 2017-12-22 | End: 2022-06-29

## 2022-06-29 RX ORDER — METHYLPREDNISOLONE 8 MG/1
8 TABLET ORAL
Qty: 8 | Refills: 0 | Status: DISCONTINUED | COMMUNITY
Start: 2018-10-09 | End: 2022-06-29

## 2022-06-29 RX ORDER — CONTAINER,EMPTY
EACH MISCELLANEOUS
Qty: 1 | Refills: 0 | Status: DISCONTINUED | COMMUNITY
Start: 2018-03-26 | End: 2022-06-29

## 2022-06-29 RX ORDER — GANIRELIX ACETATE 250 UG/.5ML
250 INJECTION, SOLUTION SUBCUTANEOUS
Qty: 6 | Refills: 3 | Status: DISCONTINUED | COMMUNITY
Start: 2018-09-18 | End: 2022-06-29

## 2022-06-29 RX ORDER — FOLLITROPIN 900 [IU]/1.5ML
900 INJECTION, SOLUTION SUBCUTANEOUS
Qty: 6 | Refills: 1 | Status: DISCONTINUED | COMMUNITY
Start: 2017-08-25 | End: 2022-06-29

## 2022-06-29 RX ORDER — FOLLITROPIN 900 [IU]/1.08ML
900 INJECTION, SOLUTION SUBCUTANEOUS
Qty: 3 | Refills: 2 | Status: DISCONTINUED | COMMUNITY
Start: 2018-10-24 | End: 2022-06-29

## 2022-06-29 RX ORDER — CONTAINER,EMPTY
EACH MISCELLANEOUS
Qty: 1 | Refills: 0 | Status: DISCONTINUED | COMMUNITY
Start: 2018-10-24 | End: 2022-06-29

## 2022-06-29 RX ORDER — PROGESTERONE 50 MG/ML
50 INJECTION, SOLUTION INTRAMUSCULAR
Qty: 2 | Refills: 4 | Status: DISCONTINUED | COMMUNITY
Start: 2019-04-05 | End: 2022-06-29

## 2022-06-29 RX ORDER — FOLLITROPIN 900 [IU]/1.5ML
900 INJECTION, SOLUTION SUBCUTANEOUS
Qty: 4 | Refills: 2 | Status: DISCONTINUED | COMMUNITY
Start: 2017-06-27 | End: 2022-06-29

## 2022-06-29 RX ORDER — ESTRADIOL 2 MG/1
2 TABLET ORAL
Qty: 90 | Refills: 0 | Status: DISCONTINUED | COMMUNITY
Start: 2018-03-26 | End: 2022-06-29

## 2022-06-29 RX ORDER — CHORIOGONADOTROPIN ALFA 10000 UNIT
10000 KIT INTRAMUSCULAR
Qty: 1 | Refills: 0 | Status: DISCONTINUED | COMMUNITY
Start: 2017-06-27 | End: 2022-06-29

## 2022-06-29 RX ORDER — METHYLPREDNISOLONE 8 MG/1
8 TABLET ORAL
Qty: 8 | Refills: 0 | Status: DISCONTINUED | COMMUNITY
Start: 2018-01-16 | End: 2022-06-29

## 2022-06-29 RX ORDER — SYRINGE W-NEEDLE,DISPOSAB,3 ML 25GX5/8"
22G X 1-1/2" SYRINGE, EMPTY DISPOSABLE MISCELLANEOUS
Qty: 20 | Refills: 1 | Status: DISCONTINUED | COMMUNITY
Start: 2018-05-03 | End: 2022-06-29

## 2022-06-29 RX ORDER — METHYLPREDNISOLONE 8 MG/1
8 TABLET ORAL
Qty: 8 | Refills: 0 | Status: DISCONTINUED | COMMUNITY
Start: 2018-04-30 | End: 2022-06-29

## 2022-06-29 RX ORDER — METHYLPREDNISOLONE 8 MG/1
8 TABLET ORAL
Qty: 10 | Refills: 0 | Status: DISCONTINUED | COMMUNITY
Start: 2017-06-27 | End: 2022-06-29

## 2022-06-29 RX ORDER — GANIRELIX ACETATE 250 UG/.5ML
250 INJECTION, SOLUTION SUBCUTANEOUS
Qty: 6 | Refills: 3 | Status: DISCONTINUED | COMMUNITY
Start: 2018-10-24 | End: 2022-06-29

## 2022-06-29 RX ORDER — ESTRADIOL 2 MG/1
2 TABLET ORAL
Qty: 90 | Refills: 3 | Status: DISCONTINUED | COMMUNITY
Start: 2019-03-11 | End: 2022-06-29

## 2022-06-30 NOTE — ASSESSMENT
[FreeTextEntry1] : Prevention:\par Magnesium 400 mg daily\par B2 400 mg daily\par \par Water intake 2 L \par Avoiding energy drinks\par \par Exercise daily\par \par Sleep hygiene- \par sleep study\par \par

## 2022-06-30 NOTE — DATA REVIEWED
[de-identified] : \par  [place] MRI Report             Final\par \par No Documents Attached\par \par \par \par \par   [facility]\par                                          [address]\par                                    [place], [place]  [address]\par                                        Department of Radiology\par                                             [phone]\par \par \par Patient Name:      NETTA CARRASCO                  Location:       PMRI\par Med Rec #:        [id number]                    Account #:      [id number]\par YOB: 1975                    Ordering:       La Turner MD\par Age: 46               Sex:    F                 Attending:      La Turner MD\par PCP:        La Turner MD\par ______________________________________________________________________________________\par \par Exam Date:      04/18/22\par Exam:         MRI BRAIN\par Order#:       [id number]\par \par \par \par EXAM: MRI brain without contrast\par \par  INDICATION: Non-specific neurological symptoms. Headache\par \par  TECHNIQUE: MR examination of brain performed without contrast utilizing axial diffusion-\par weighted/ADC, axial T2 FLAIR, sagittal T2 FLAIR, coronal T2 FLAIR, axial T1, sagittal T1, coronal\par T1, axial susceptibility weighted sequences.\par \par  COMPARISON: March 14, 2014 MRI brain\par \par  FINDINGS:\par \par  Ventricles are normal in size and configuration for patient's age. No hydrocephalus or extra-axial\par fluid collection.\par \par  No abnormal restricted diffusion identified to suggest acute territorial infarction. No acute\par intracranial hemorrhage. Few scattered foci of subcortical white matter T2 hyperintensity with a\par frontal lobe predominance which are nonspecific in this age demographic may reflect sequela of\par chronic migraine headache, sequela of minimal chronic microvascular ischemia versus gliosis of other\par etiologies. No significant midline shift, mass effect or herniation. Susceptibility weighted\par sequences are within normal limits without evidence for chronic blood products.\par \par  Visualized proximal arterial and dural venous sinus flow voids preserved on spin-echo sequences.\par Mild to moderate polypoid mucosal thickening of maxillary ethmoid sinuses. The mastoid air cells are\par well aerated.\par \par  No suspicious expansile or destructive calvarial lesion identified.\par \par  Sella and suprasellar region are unremarkable.\par \par \par \par  IMPRESSION:\par \par  No acute intracranial hemorrhage, acute infarction, extra-axial fluid collection or hydrocephalus.\par \par  If there are questions/need for clarification regarding this report, Dr. Jay Ervin can be best\par reached via the patient [facility] email system at [email or web address].\par \par  --- End of Report ---\par \par ***Electronically Signed ***\par -----------------------------------------------\par Jay Barrientos MD              04/19/22 1206\par \par Dictated on 04/19/22\par \par \par Report cc:  La Turner MD;\par \par  \par \par  Ordered by: LA TURNER       Collected/Examined: 18Apr2022 11:55AM       \par Verified by: LA TURNER [id number] 10:23PM       \par  Result Communication: Discussed results with patient;\par Stage: Final       \par  Performed at: [facility]       Resulted: 19Apr2022 12:06PM       Last Updated: [id number] 10:23PM       Accession: [id number]

## 2022-06-30 NOTE — HISTORY OF PRESENT ILLNESS
[FreeTextEntry1] : April- cosmetic Botox - helped with headaches bc she can't squeeze forehead\par \par Headache\par Age of onset: High school\par \par Description\par bilateral with radiation to occiput\par Quality- pulsating, throbbing- hears "a popping" sound  \par No Nausea or vomiting\par Yes Light or sound sensitivity\par \par Warning/aura (premonitory phase)- has seen spots in your vision on occasion - not for last 2-3 years\par \par Postdrome: not sure\par \par Nocturnal awakening- not consistently\par Associated with exertion or Valsalva\par \par Once headache starts- duration:\par With medication: Aleve- 2-3 hours\par Without medication: 12+ hours\par \par Average number of headache days per week: 2-3 times a week\par \par \par Association with menstrual cycle- no\par Contraceptive use- no \par \par Triggers\par Stress \par \par Sleep pattern: 930-10 pm and wakes 545 am\par Snores at night; not sure if she wakes up as fatigued\par \par Water intake: May be 2 glasses of water\par \par Caffeine intake: Large iced coffee in am ([employer] don[employer]); celsius drink\par \par Anxiety/depression/stress: Started fluoxetine 2 months ago; stressed at work\par \par Family History of headaches: Paternal grandmother and aunt had headaches\par \par Personal history of head trauma: no\par \par Preventatives tried: None\par Rescue tried: Aleve\par \par 2.4 y/o daughter- \par \par Prevention: Labetolol (long standing hypertension)

## 2022-07-06 ENCOUNTER — RX RENEWAL (OUTPATIENT)
Age: 47
End: 2022-07-06

## 2022-07-11 ENCOUNTER — APPOINTMENT (OUTPATIENT)
Dept: NEUROLOGY | Facility: CLINIC | Age: 47
End: 2022-07-11

## 2022-07-11 PROCEDURE — 95806 SLEEP STUDY UNATT&RESP EFFT: CPT

## 2022-07-12 ENCOUNTER — APPOINTMENT (OUTPATIENT)
Dept: NEUROLOGY | Facility: CLINIC | Age: 47
End: 2022-07-12

## 2022-07-19 ENCOUNTER — APPOINTMENT (OUTPATIENT)
Dept: BARIATRICS/WEIGHT MGMT | Facility: CLINIC | Age: 47
End: 2022-07-19

## 2022-07-19 VITALS
OXYGEN SATURATION: 97 % | HEART RATE: 73 BPM | SYSTOLIC BLOOD PRESSURE: 108 MMHG | RESPIRATION RATE: 16 BRPM | HEIGHT: 71 IN | BODY MASS INDEX: 32.1 KG/M2 | DIASTOLIC BLOOD PRESSURE: 72 MMHG | WEIGHT: 229.25 LBS

## 2022-07-19 PROCEDURE — 99205 OFFICE O/P NEW HI 60 MIN: CPT

## 2022-07-19 RX ORDER — PEN NEEDLE, DIABETIC 29 G X1/2"
32G X 4 MM NEEDLE, DISPOSABLE MISCELLANEOUS
Qty: 1 | Refills: 2 | Status: ACTIVE | COMMUNITY
Start: 2022-07-19 | End: 1900-01-01

## 2022-07-19 NOTE — CONSULT LETTER
[Dear  ___] : Dear  [unfilled], [Consult Letter:] : I had the pleasure of evaluating your patient, [unfilled]. [Please see my note below.] : Please see my note below. [Consult Closing:] : Thank you very much for allowing me to participate in the care of this patient.  If you have any questions, please do not hesitate to contact me. [Sincerely,] : Sincerely, [FreeTextEntry3] : Lanie Weaver, NP

## 2022-07-19 NOTE — HISTORY OF PRESENT ILLNESS
[FreeTextEntry1] : 47 y/o F here for initial medical weight management consultation, referred by Dr. Turner \par Medical Hx: Obesity, HLD, HTN, GRACE, headaches, depression\par Started struggling with weight as a child, has been able to get down to 166 lbs with vigorous exercise and dietary modification. Tried WW, Noom, Isogenix, Running, Strength training. Feels that she is struggling with losing weight with dietary modification and feels frustrated and lacking motivation. \par Highest Adult Wt: 229 lb, Lowest Adult Wt: 166-174 lbs, Goal- 175 lbs\par Food Recall: B- Isogenix protein shake, L- cucumbers, D- baked ziti (small portion), denies snacking throughout the day\par Water Intake:32 ounces daily, has a 64 ounce water bottle and trying to work on increasing daily water intake \par Exercise Regimen: denies having a formal exercise regimen, has a Pelaton and loved weight training in the past \par Sleep: 10p-6 am, feels tired in the afternoon, recent sleep study done\par Stress Level: high, feels stressed and depressed, started taking Prozac 3 months ago and has reached out to 4 therapist who didn't have availability, continuing to look for therapist \par Gyn: + infertility, required IVF, not currently planning on becoming pregnant\par Social Hx: , 1 daughter (Maya), denies smoking, + alcohol intake (1-3 glasses of wine 2-3 times per week)\par Tanita Body Comp- Wt: 229.4 lb, Fat % 47.6 %, Fat Mass- 109.2 lb, FFM- 120.2 lb, TBW 87.6 lb, 38.2%, BMR- 1709 kcal, BMI- 32\par \par \par

## 2022-07-19 NOTE — ASSESSMENT
[FreeTextEntry1] : Recommend dietary modification. Reduce intake of bad fat including-fried foods, full fat dairy products, red meat, meat with skin on it, processed fats/ snacks. Recommend eating foods with healthy fat including avocado, fish, omega-3 fatty acids, nuts, lean protein. Recommend avoiding simple sugars and instead eating complex carbohydrates/ whole grains. Recommend increasing your physical activity. \par Physical Activity- recommend aerobic exercise 3-4 times per week for 30-45 mins, recommend incorporating strength training 3 times per week.\par Exercise goal- start walking and strength training videos on [employer] brett 3 x per week \par Medication- pt meets criteria to initiate medication treatment of obesity. \par Discussed the option to treat obesity with Saxenda. Patient denies contraindications to taking saxenda: denies family hx or personal history of medullary thyroid carcinoma or MEN 2, denies history of pancreatitis/gallbladder disease/hypoglycemia/renal impairment/ suicidal ideation.  Discussed mechanism of action- works like GLP-1 by regulating appetite/ reducing hunger. Reviewed side effects including: N/V/D/C, injection site reactions, headaches, low blood sugar, dizziness, abdominal pain, and fatigue. Reviewed ways to decrease nausea including: eating bland/lowfat foods, eating foods that contain water, and avoiding lying flat after eating. Discussed injection education & titration schedule: start with 0.6mg SQ Daily and increase by 0.6 mg weekly, with maximum dose of 3 mg daily.\par Gave 2 sample boxes and pt able to demonstrate how to give herself the SQ injection\par Mental Health- rec looking into [facility] for therapist\par RTO in 1 month \par \par

## 2022-07-31 ENCOUNTER — RX RENEWAL (OUTPATIENT)
Age: 47
End: 2022-07-31

## 2022-08-30 ENCOUNTER — APPOINTMENT (OUTPATIENT)
Dept: BARIATRICS/WEIGHT MGMT | Facility: CLINIC | Age: 47
End: 2022-08-30

## 2022-08-30 VITALS
OXYGEN SATURATION: 98 % | SYSTOLIC BLOOD PRESSURE: 114 MMHG | RESPIRATION RATE: 18 BRPM | WEIGHT: 224.25 LBS | HEIGHT: 71 IN | BODY MASS INDEX: 31.4 KG/M2 | TEMPERATURE: 97.1 F | DIASTOLIC BLOOD PRESSURE: 62 MMHG | HEART RATE: 70 BPM

## 2022-08-30 PROCEDURE — 99213 OFFICE O/P EST LOW 20 MIN: CPT

## 2022-08-30 RX ORDER — SEMAGLUTIDE 1.34 MG/ML
2 INJECTION, SOLUTION SUBCUTANEOUS
Qty: 1 | Refills: 0 | Status: DISCONTINUED | COMMUNITY
Start: 2022-08-30 | End: 2022-08-30

## 2022-08-30 NOTE — HISTORY OF PRESENT ILLNESS
[FreeTextEntry1] : 45 y/o F here for initial medical weight management consultation, referred by Dr. Turner \par Medical Hx: Obesity, HLD, HTN, GRACE, headaches, depression\par Started struggling with weight as a child, has been able to get down to 166 lbs with vigorous exercise and dietary modification. Tried WW, Noom, Isogenix, Running, Strength training. Feels that she is struggling with losing weight with dietary modification and feels frustrated and lacking motivation. \par Highest Adult Wt: 229 lb, Lowest Adult Wt: 166-174 lbs, Goal- 175 lbs\par Food Recall: B- Isogenix protein shake, L- cucumbers, D- baked ziti (small portion), denies snacking throughout the day\par Water Intake:32 ounces daily, has a 64 ounce water bottle and trying to work on increasing daily water intake \par Exercise Regimen: denies having a formal exercise regimen, has a Pelaton and loved weight training in the past \par Sleep: 10p-6 am, feels tired in the afternoon, recent sleep study done\par Stress Level: high, feels stressed and depressed, started taking Prozac 3 months ago and has reached out to 4 therapist who didn't have availability, continuing to look for therapist \par Gyn: + infertility, required IVF, not currently planning on becoming pregnant\par Social Hx: , 1 daughter (Maya), denies smoking, + alcohol intake (1-3 glasses of wine 2-3 times per week)\par Tanita Body Comp- Wt: 229.4 lb, Fat % 47.6 %, Fat Mass- 109.2 lb, FFM- 120.2 lb, TBW 87.6 lb, 38.2%, BMR- 1709 kcal, BMI- 32\par \par 8/30/22:Lost 6 lbs, reports being down 12 lbs while taking saxenda, frustrated that insurance denied coverage, sent an appeal with response pending. Experienced good appetite suppression & portion control while taking saxenda. Denied side effects on medication. Denies exercising at present.  Tanita Body Comp: Wt: 224.4 lb, Fat 46.2%, Fat Mass 103.6 lb, FFM-120.8, TBW 87.6 lb, tbw 39%, BMR 1707 kcal, BMI 31. \par \par

## 2022-08-30 NOTE — ASSESSMENT
[FreeTextEntry1] : Dietary- continue to focus on eating lean protein, vegetables and complex carbs. try to avoid simple sugars\par Physical Activity- recommend aerobic exercise 3-4 times per week for 30-45 mins, recommend incorporating strength training 3 times per week.\par Exercise goal- start walking and strength training videos on [employer] brett 3 x per week \par Medication- pt meets criteria to initiate medication treatment of obesity. \par Sent appeal for saxenda, awaiting response. Tried to get ozempic instead but not covered.\par Mental Health- rec looking into [facility] for therapist at first visit\par RTO in 1 month \par \par

## 2022-10-24 ENCOUNTER — APPOINTMENT (OUTPATIENT)
Dept: INTERNAL MEDICINE | Facility: CLINIC | Age: 47
End: 2022-10-24

## 2022-10-24 VITALS
DIASTOLIC BLOOD PRESSURE: 80 MMHG | OXYGEN SATURATION: 98 % | SYSTOLIC BLOOD PRESSURE: 130 MMHG | BODY MASS INDEX: 32.48 KG/M2 | WEIGHT: 232 LBS | HEIGHT: 71 IN | HEART RATE: 64 BPM

## 2022-10-24 DIAGNOSIS — R05.9 COUGH, UNSPECIFIED: ICD-10-CM

## 2022-10-24 PROCEDURE — 99213 OFFICE O/P EST LOW 20 MIN: CPT

## 2022-10-24 RX ORDER — FLUTICASONE PROPIONATE 50 UG/1
50 SPRAY, METERED NASAL TWICE DAILY
Qty: 1 | Refills: 0 | Status: ACTIVE | COMMUNITY
Start: 2022-10-24 | End: 1900-01-01

## 2022-10-24 RX ORDER — LIDOCAINE 5% 700 MG/1
5 PATCH TOPICAL
Qty: 10 | Refills: 0 | Status: ACTIVE | COMMUNITY
Start: 2022-10-16

## 2022-10-24 RX ORDER — ALBUTEROL SULFATE 90 UG/1
108 (90 BASE) INHALANT RESPIRATORY (INHALATION)
Qty: 1 | Refills: 1 | Status: ACTIVE | COMMUNITY
Start: 2022-10-24 | End: 1900-01-01

## 2022-10-24 NOTE — PHYSICAL EXAM
[Normal Outer Ear/Nose] : the outer ears and nose were normal in appearance [Normal TMs] : both tympanic membranes were normal [No JVD] : no jugular venous distention [No Lymphadenopathy] : no lymphadenopathy [Normal] : normal rate, regular rhythm, normal S1 and S2 and no murmur heard [Normal Supraclavicular Nodes] : no supraclavicular lymphadenopathy [Normal Posterior Cervical Nodes] : no posterior cervical lymphadenopathy [Normal Anterior Cervical Nodes] : no anterior cervical lymphadenopathy [de-identified] : + pharyngeal erythema no tonsillar swelling or exudate

## 2022-10-24 NOTE — HISTORY OF PRESENT ILLNESS
[FreeTextEntry8] : CC: cough\par \par Reports cough, hoarse voice, for the past week, cough is productive of greenish phlegm, no blood + sore throat, but no dysphagia, No F/C , initially had some ear discomfort, resolving, no facial pain, + neck pain, no dyspnea or chest pain. 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 \par Home COVID test was negative. \par \par She has a Hx of asthma, uses albuterol PRN, she has used her rescue inhaler once over the past week.\par She has a Hx of rheumatic fever, no rheumatic heart disease

## 2022-10-26 LAB
RAPID RVP RESULT: DETECTED
RV+EV RNA SPEC QL NAA+PROBE: DETECTED
SARS-COV-2 RNA PNL RESP NAA+PROBE: NOT DETECTED

## 2022-12-22 ENCOUNTER — RX RENEWAL (OUTPATIENT)
Age: 47
End: 2022-12-22

## 2023-01-19 ENCOUNTER — RX RENEWAL (OUTPATIENT)
Age: 48
End: 2023-01-19

## 2023-01-31 DIAGNOSIS — Z12.39 ENCOUNTER FOR OTHER SCREENING FOR MALIGNANT NEOPLASM OF BREAST: ICD-10-CM

## 2023-02-13 ENCOUNTER — APPOINTMENT (OUTPATIENT)
Dept: BARIATRICS/WEIGHT MGMT | Facility: CLINIC | Age: 48
End: 2023-02-13

## 2023-02-15 ENCOUNTER — APPOINTMENT (OUTPATIENT)
Dept: BARIATRICS/WEIGHT MGMT | Facility: CLINIC | Age: 48
End: 2023-02-15
Payer: COMMERCIAL

## 2023-02-15 VITALS — WEIGHT: 225 LBS | BODY MASS INDEX: 31.38 KG/M2

## 2023-02-15 PROCEDURE — 99214 OFFICE O/P EST MOD 30 MIN: CPT | Mod: 95

## 2023-02-15 NOTE — ASSESSMENT
[FreeTextEntry1] : Recommend diet full of lean protein, vegetables and complex carbs\par Physical Activity- recommend aerobic exercise 3-4 times per week for 30-45 mins, recommend incorporating strength training 3 times per week. Start with exercise as tolerated with current foot issues \par Medication- pt meets criteria to initiate medication treatment of obesity. Tolerated saxenda and mounjaro well in the past. \par Sent Rx for mounjaro 2.5 and 5 mg to see if pharmacy will continue coupon program.\par Would try for saxenda or wegovy next since she meets requirements \par Labs- ordered f/u Labs to be done at her local lab\par RTO in 2 weeks to f/u \par \par

## 2023-02-15 NOTE — HISTORY OF PRESENT ILLNESS
[Verbal consent obtained from patient] : the patient, [unfilled] [Other Location: e.g. School (Enter Location, City,State)___] : at [unfilled], at the time of the visit. [Other Location: e.g. Home (Enter Location, City,State)___] : at [unfilled] [FreeTextEntry1] : 47 y/o F here for initial medical weight management consultation, referred by Dr. Turner \par Medical Hx: Obesity, HLD, HTN, GRACE, headaches, depression\par Started struggling with weight as a child, has been able to get down to 166 lbs with vigorous exercise and dietary modification. Tried WW, Noom, Isogenix, Running, Strength training. Feels that she is struggling with losing weight with dietary modification and feels frustrated and lacking motivation. \par Highest Adult Wt: 229 lb, Lowest Adult Wt: 166-174 lbs, Goal- 175 lbs\par Food Recall: B- Isogenix protein shake, L- cucumbers, D- baked ziti (small portion), denies snacking throughout the day\par Water Intake:32 ounces daily, has a 64 ounce water bottle and trying to work on increasing daily water intake \par Exercise Regimen: denies having a formal exercise regimen, has a Pelaton and loved weight training in the past \par Sleep: 10p-6 am, feels tired in the afternoon, recent sleep study done\par Stress Level: high, feels stressed and depressed, started taking Prozac 3 months ago and has reached out to 4 therapist who didn't have availability, continuing to look for therapist \par Gyn: + infertility, required IVF, not currently planning on becoming pregnant\par Social Hx: , 1 daughter (Maya), denies smoking, + alcohol intake (1-3 glasses of wine 2-3 times per week)\par Tanita Body Comp- Wt: 229.4 lb, Fat % 47.6 %, Fat Mass- 109.2 lb, FFM- 120.2 lb, TBW 87.6 lb, 38.2%, BMR- 1709 kcal, BMI- 32\par \par 8/30/22:Lost 6 lbs, reports being down 12 lbs while taking saxenda, frustrated that insurance denied coverage, sent an appeal with response pending. Experienced good appetite suppression & portion control while taking saxenda. Denied side effects on medication. Denies exercising at present.  Tanita Body Comp: Wt: 224.4 lb, Fat 46.2%, Fat Mass 103.6 lb, FFM-120.8, TBW 87.6 lb, tbw 39%, BMR 1707 kcal, BMI 31. \par \par 2/15/23: Was able to take mounjaro 2.5 mg for 2 months and tolerated well, had lost 4 lbs. Discontinued over the holidays and has since regained weight. Continues to focus on eating healthy dietary intake overall. Walking and active at school, but struggling with plantar fascitis- receiving injections to help with pain. \par \par

## 2023-02-17 ENCOUNTER — RX RENEWAL (OUTPATIENT)
Age: 48
End: 2023-02-17

## 2023-02-22 ENCOUNTER — NON-APPOINTMENT (OUTPATIENT)
Age: 48
End: 2023-02-22

## 2023-02-22 ENCOUNTER — APPOINTMENT (OUTPATIENT)
Dept: CARDIOLOGY | Facility: CLINIC | Age: 48
End: 2023-02-22
Payer: COMMERCIAL

## 2023-02-22 VITALS
HEIGHT: 71 IN | DIASTOLIC BLOOD PRESSURE: 80 MMHG | OXYGEN SATURATION: 100 % | TEMPERATURE: 98.6 F | HEART RATE: 80 BPM | WEIGHT: 232 LBS | SYSTOLIC BLOOD PRESSURE: 110 MMHG | BODY MASS INDEX: 32.48 KG/M2

## 2023-02-22 PROCEDURE — 99214 OFFICE O/P EST MOD 30 MIN: CPT | Mod: 25

## 2023-02-22 PROCEDURE — 93000 ELECTROCARDIOGRAM COMPLETE: CPT

## 2023-02-22 RX ORDER — LABETALOL HYDROCHLORIDE 100 MG/1
100 TABLET, FILM COATED ORAL
Qty: 90 | Refills: 1 | Status: DISCONTINUED | COMMUNITY
Start: 2021-11-03 | End: 2023-02-22

## 2023-02-23 ENCOUNTER — RESULT REVIEW (OUTPATIENT)
Age: 48
End: 2023-02-23

## 2023-02-27 ENCOUNTER — APPOINTMENT (OUTPATIENT)
Dept: BARIATRICS/WEIGHT MGMT | Facility: CLINIC | Age: 48
End: 2023-02-27
Payer: COMMERCIAL

## 2023-02-27 PROCEDURE — 99213 OFFICE O/P EST LOW 20 MIN: CPT | Mod: 95

## 2023-02-27 NOTE — ASSESSMENT
[FreeTextEntry1] : Dietary- continue to focus on getting adequate protein and vegetables daily. Rec about 1,000 kcals per day at least \par Water intake- rec 64 ounces daily\par Exercise as tolerated, may need PT for plantar fascitits- has a f/u apt with ortho scheduled\par Medication- inc dose to 5 mg mounjaro weekly in 2 weeks \par RTO in 1 month\par \par

## 2023-02-27 NOTE — HISTORY OF PRESENT ILLNESS
[Home] : at home, [unfilled] , at the time of the visit. [Other Location: e.g. Home (Enter Location, City,State)___] : at [unfilled] [Verbal consent obtained from patient] : the patient, [unfilled] [FreeTextEntry1] : 47 y/o F here for initial medical weight management consultation, referred by Dr. Turner \par Medical Hx: Obesity, HLD, HTN, GRACE, headaches, depression\par Started struggling with weight as a child, has been able to get down to 166 lbs with vigorous exercise and dietary modification. Tried WW, Noom, Isogenix, Running, Strength training. Feels that she is struggling with losing weight with dietary modification and feels frustrated and lacking motivation. \par Highest Adult Wt: 229 lb, Lowest Adult Wt: 166-174 lbs, Goal- 175 lbs\par Food Recall: B- Isogenix protein shake, L- cucumbers, D- baked ziti (small portion), denies snacking throughout the day\par Water Intake:32 ounces daily, has a 64 ounce water bottle and trying to work on increasing daily water intake \par Exercise Regimen: denies having a formal exercise regimen, has a Pelaton and loved weight training in the past \par Sleep: 10p-6 am, feels tired in the afternoon, recent sleep study done\par Stress Level: high, feels stressed and depressed, started taking Prozac 3 months ago and has reached out to 4 therapist who didn't have availability, continuing to look for therapist \par Gyn: + infertility, required IVF, not currently planning on becoming pregnant\par Social Hx: , 1 daughter (aMya), denies smoking, + alcohol intake (1-3 glasses of wine 2-3 times per week)\par Tanita Body Comp- Wt: 229.4 lb, Fat % 47.6 %, Fat Mass- 109.2 lb, FFM- 120.2 lb, TBW 87.6 lb, 38.2%, BMR- 1709 kcal, BMI- 32\par \par 8/30/22:Lost 6 lbs, reports being down 12 lbs while taking saxenda, frustrated that insurance denied coverage, sent an appeal with response pending. Experienced good appetite suppression & portion control while taking saxenda. Denied side effects on medication. Denies exercising at present.  Tanita Body Comp: Wt: 224.4 lb, Fat 46.2%, Fat Mass 103.6 lb, FFM-120.8, TBW 87.6 lb, tbw 39%, BMR 1707 kcal, BMI 31. \par \par 2/15/23: Was able to take mounjaro 2.5 mg for 2 months and tolerated well, had lost 4 lbs. Discontinued over the holidays and has since regained weight. Continues to focus on eating healthy dietary intake overall. Walking and active at school, but struggling with plantar fascitis- receiving injections to help with pain. \par \par 2/27/23: Started taking mounjaro 2.5 mg weekly. Feels better and decreased appetite.Working on inc water intake and getting adequate dietary intake daily. Ate protein shake for breakfast. Hasn't weighed herself because is can frustrate her. Continues to struggle with plantar fascitis pain, plans on seeing ortho tomorrow\par

## 2023-05-03 ENCOUNTER — APPOINTMENT (OUTPATIENT)
Dept: BARIATRICS/WEIGHT MGMT | Facility: CLINIC | Age: 48
End: 2023-05-03
Payer: COMMERCIAL

## 2023-05-03 VITALS — BODY MASS INDEX: 29.68 KG/M2 | WEIGHT: 212.8 LBS

## 2023-05-03 PROCEDURE — 99213 OFFICE O/P EST LOW 20 MIN: CPT | Mod: 95

## 2023-05-03 NOTE — HISTORY OF PRESENT ILLNESS
[Home] : at home, [unfilled] , at the time of the visit. [Other Location: e.g. Home (Enter Location, City,State)___] : at [unfilled] [Verbal consent obtained from patient] : the patient, [unfilled] [FreeTextEntry1] : 47 y/o F here for initial medical weight management consultation, referred by Dr. Turner \par Medical Hx: Obesity, HLD, HTN, GRACE, headaches, depression\par Started struggling with weight as a child, has been able to get down to 166 lbs with vigorous exercise and dietary modification. Tried WW, Noom, Isogenix, Running, Strength training. Feels that she is struggling with losing weight with dietary modification and feels frustrated and lacking motivation. \par Highest Adult Wt: 229 lb, Lowest Adult Wt: 166-174 lbs, Goal- 175 lbs\par Food Recall: B- Isogenix protein shake, L- cucumbers, D- baked ziti (small portion), denies snacking throughout the day\par Water Intake:32 ounces daily, has a 64 ounce water bottle and trying to work on increasing daily water intake \par Exercise Regimen: denies having a formal exercise regimen, has a Pelaton and loved weight training in the past \par Sleep: 10p-6 am, feels tired in the afternoon, recent sleep study done\par Stress Level: high, feels stressed and depressed, started taking Prozac 3 months ago and has reached out to 4 therapist who didn't have availability, continuing to look for therapist \par Gyn: + infertility, required IVF, not currently planning on becoming pregnant\par Social Hx: , 1 daughter (Maya), denies smoking, + alcohol intake (1-3 glasses of wine 2-3 times per week)\par Tanita Body Comp- Wt: 229.4 lb, Fat % 47.6 %, Fat Mass- 109.2 lb, FFM- 120.2 lb, TBW 87.6 lb, 38.2%, BMR- 1709 kcal, BMI- 32\par \par 8/30/22:Lost 6 lbs, reports being down 12 lbs while taking saxenda, frustrated that insurance denied coverage, sent an appeal with response pending. Experienced good appetite suppression & portion control while taking saxenda. Denied side effects on medication. Denies exercising at present.  Tanita Body Comp: Wt: 224.4 lb, Fat 46.2%, Fat Mass 103.6 lb, FFM-120.8, TBW 87.6 lb, tbw 39%, BMR 1707 kcal, BMI 31. \par \par 2/15/23: Was able to take mounjaro 2.5 mg for 2 months and tolerated well, had lost 4 lbs. Discontinued over the holidays and has since regained weight. Continues to focus on eating healthy dietary intake overall. Walking and active at school, but struggling with plantar fascitis- receiving injections to help with pain. \par \par 2/27/23: Started taking mounjaro 2.5 mg weekly. Feels better and decreased appetite.Working on inc water intake and getting adequate dietary intake daily. Ate protein shake for breakfast. Hasn't weighed herself because is can frustrate her. Continues to struggle with plantar fascitis pain, plans on seeing ortho tomorrow\par \par 5/3/23: Lost 20 lbs. B-Apple or banana, acai bowl fruit, granola, L-isogenix shake, D- chicken with cucumbers. Drinking adequate water intake, better in the afternoon then while at school. Denies exercising- no motivation. Plans on seeing PCP next week and will have labs rechecked at that time.

## 2023-05-03 NOTE — ASSESSMENT
[FreeTextEntry1] : Dietary- increase protein intake daily with goal of getting 60 + grams daily \par Physical Activity- recommend aerobic exercise 3-4 times per week for 30-45 mins, recommend incorporating strength training 3 times per week. Goal- go to [employer] or track for exercise after school and before going home \par Medication- inc dose of mounjaro to 7.5 mg weekly\par RTO in 1 month \par \par

## 2023-05-10 ENCOUNTER — APPOINTMENT (OUTPATIENT)
Dept: INTERNAL MEDICINE | Facility: CLINIC | Age: 48
End: 2023-05-10
Payer: COMMERCIAL

## 2023-05-10 VITALS
DIASTOLIC BLOOD PRESSURE: 84 MMHG | BODY MASS INDEX: 29.54 KG/M2 | WEIGHT: 211 LBS | SYSTOLIC BLOOD PRESSURE: 118 MMHG | HEART RATE: 79 BPM | RESPIRATION RATE: 16 BRPM | OXYGEN SATURATION: 98 % | TEMPERATURE: 98 F | HEIGHT: 71 IN

## 2023-05-10 DIAGNOSIS — R79.89 OTHER SPECIFIED ABNORMAL FINDINGS OF BLOOD CHEMISTRY: ICD-10-CM

## 2023-05-10 DIAGNOSIS — E66.9 OBESITY, UNSPECIFIED: ICD-10-CM

## 2023-05-10 DIAGNOSIS — Z12.11 ENCOUNTER FOR SCREENING FOR MALIGNANT NEOPLASM OF COLON: ICD-10-CM

## 2023-05-10 DIAGNOSIS — Z13.1 ENCOUNTER FOR SCREENING FOR DIABETES MELLITUS: ICD-10-CM

## 2023-05-10 PROCEDURE — 99214 OFFICE O/P EST MOD 30 MIN: CPT | Mod: 25

## 2023-05-10 PROCEDURE — 93000 ELECTROCARDIOGRAM COMPLETE: CPT | Mod: 59

## 2023-05-10 PROCEDURE — 99396 PREV VISIT EST AGE 40-64: CPT | Mod: 25

## 2023-05-10 PROCEDURE — 36415 COLL VENOUS BLD VENIPUNCTURE: CPT

## 2023-05-13 ENCOUNTER — APPOINTMENT (OUTPATIENT)
Dept: INTERNAL MEDICINE | Facility: CLINIC | Age: 48
End: 2023-05-13
Payer: COMMERCIAL

## 2023-05-13 PROCEDURE — 36415 COLL VENOUS BLD VENIPUNCTURE: CPT

## 2023-05-18 LAB
25(OH)D3 SERPL-MCNC: 32.9 NG/ML
ALBUMIN SERPL ELPH-MCNC: 4.6 G/DL
ALP BLD-CCNC: 74 U/L
ALT SERPL-CCNC: 23 U/L
ANION GAP SERPL CALC-SCNC: 14 MMOL/L
APPEARANCE: ABNORMAL
AST SERPL-CCNC: 21 U/L
BACTERIA: ABNORMAL /HPF
BASOPHILS # BLD AUTO: 0.05 K/UL
BASOPHILS NFR BLD AUTO: 0.8 %
BILIRUB SERPL-MCNC: 0.4 MG/DL
BILIRUBIN URINE: NEGATIVE
BLOOD URINE: NEGATIVE
BUN SERPL-MCNC: 10 MG/DL
CALCIUM SERPL-MCNC: 9.7 MG/DL
CAST: 0 /LPF
CHLORIDE SERPL-SCNC: 106 MMOL/L
CHOLEST SERPL-MCNC: 241 MG/DL
CO2 SERPL-SCNC: 20 MMOL/L
COLOR: YELLOW
CREAT SERPL-MCNC: 0.8 MG/DL
EGFR: 92 ML/MIN/1.73M2
EOSINOPHIL # BLD AUTO: 0.34 K/UL
EOSINOPHIL NFR BLD AUTO: 5.4 %
EPITHELIAL CELLS: 14 /HPF
ESTIMATED AVERAGE GLUCOSE: 100 MG/DL
GLUCOSE QUALITATIVE U: NEGATIVE MG/DL
GLUCOSE SERPL-MCNC: 86 MG/DL
HBA1C MFR BLD HPLC: 5.1 %
HCT VFR BLD CALC: 42.2 %
HDLC SERPL-MCNC: 58 MG/DL
HGB BLD-MCNC: 13.4 G/DL
IMM GRANULOCYTES NFR BLD AUTO: 0.2 %
KETONES URINE: NEGATIVE MG/DL
LDLC SERPL CALC-MCNC: 168 MG/DL
LEUKOCYTE ESTERASE URINE: ABNORMAL
LYMPHOCYTES # BLD AUTO: 2.05 K/UL
LYMPHOCYTES NFR BLD AUTO: 32.8 %
MAN DIFF?: NORMAL
MCHC RBC-ENTMCNC: 29.6 PG
MCHC RBC-ENTMCNC: 31.8 GM/DL
MCV RBC AUTO: 93.2 FL
MICROSCOPIC-UA: NORMAL
MONOCYTES # BLD AUTO: 0.4 K/UL
MONOCYTES NFR BLD AUTO: 6.4 %
NEUTROPHILS # BLD AUTO: 3.4 K/UL
NEUTROPHILS NFR BLD AUTO: 54.4 %
NITRITE URINE: NEGATIVE
NONHDLC SERPL-MCNC: 183 MG/DL
PH URINE: 6
PLATELET # BLD AUTO: 297 K/UL
POTASSIUM SERPL-SCNC: 4.8 MMOL/L
PROT SERPL-MCNC: 6.8 G/DL
PROTEIN URINE: NEGATIVE MG/DL
RBC # BLD: 4.53 M/UL
RBC # FLD: 12.5 %
RED BLOOD CELLS URINE: 2 /HPF
SODIUM SERPL-SCNC: 141 MMOL/L
SPECIFIC GRAVITY URINE: 1.02
TRIGL SERPL-MCNC: 71 MG/DL
TSH SERPL-ACNC: 1.36 UIU/ML
UROBILINOGEN URINE: 0.2 MG/DL
WBC # FLD AUTO: 6.25 K/UL
WHITE BLOOD CELLS URINE: 4 /HPF

## 2023-05-19 PROBLEM — E66.9 OBESITY (BMI 30.0-34.9): Status: ACTIVE | Noted: 2022-05-09

## 2023-05-19 PROBLEM — R79.89 LOW VITAMIN D LEVEL: Status: ACTIVE | Noted: 2022-02-12

## 2023-05-19 PROBLEM — Z12.11 SCREENING FOR COLON CANCER: Status: ACTIVE | Noted: 2022-02-12

## 2023-05-19 PROBLEM — Z13.1 SCREENING FOR DIABETES MELLITUS: Status: ACTIVE | Noted: 2022-02-12

## 2023-05-19 NOTE — REVIEW OF SYSTEMS
[Depression] : depression [Insomnia] : insomnia [Fever] : no fever [Earache] : no earache [Hoarseness] : no hoarseness [Sore Throat] : no sore throat [Chest Pain] : no chest pain [Palpitations] : no palpitations [Lower Ext Edema] : no lower extremity edema [Shortness Of Breath] : no shortness of breath [Wheezing] : no wheezing [Cough] : no cough [Abdominal Pain] : no abdominal pain [Constipation] : no constipation [Diarrhea] : diarrhea [Melena] : no melena [Dysuria] : no dysuria [Frequency] : no frequency [Joint Pain] : no joint pain [Joint Stiffness] : no joint stiffness [Joint Swelling] : no joint swelling [Itching] : no itching [Skin Rash] : no skin rash [Suicidal] : not suicidal [Easy Bleeding] : no easy bleeding [Swollen Glands] : no swollen glands [FreeTextEntry3] : last eye exam 27/021 [FreeTextEntry8] : Stress incontinence [de-identified] : Positive moles [de-identified] : headaches daily [de-identified] : Moderate depression by PHQ-9

## 2023-05-19 NOTE — HISTORY OF PRESENT ILLNESS
[FreeTextEntry1] : Annual physical [de-identified] : Patient is a 46-year-old lady, history of hypertension, allergy induced asthma here for her annual physical.\par She is followed by cardiologist, Dr. Berumen.  She denies chest pain, shortness of breath, leg swelling.\par She became tearful when asked about history of depression.  She thinks she is depressed.  Of note she was treated with Prozac for depression years ago.  She mentions the loss of her mother and also spousal difficulties.  She wants to see you psychiatrist and a therapist.  She denies suicidal ideations.\par She had COVID infection in December 2020 and 3 weeks ago.\par She had COVID vaccine  April 11, 5/2\par Tdap up to date 2019\par She has allergy induced asthma; on Albuterol and Steroid inhalers

## 2023-05-19 NOTE — PHYSICAL EXAM
[No Acute Distress] : no acute distress [No JVD] : no jugular venous distention [No Respiratory Distress] : no respiratory distress  [No Accessory Muscle Use] : no accessory muscle use [Normal Rate] : normal rate  [Regular Rhythm] : with a regular rhythm [Normal S1, S2] : normal S1 and S2 [No Murmur] : no murmur heard [No Carotid Bruits] : no carotid bruits [No Varicosities] : no varicosities [No Edema] : there was no peripheral edema [Normal Appearance] : normal in appearance [No Nipple Discharge] : no nipple discharge [No Axillary Lymphadenopathy] : no axillary lymphadenopathy [Soft] : abdomen soft [Non Tender] : non-tender [No Masses] : no abdominal mass palpated [Normal Bowel Sounds] : normal bowel sounds [Coordination Grossly Intact] : coordination grossly intact [No Focal Deficits] : no focal deficits [Normal Gait] : normal gait [Speech Grossly Normal] : speech grossly normal [Memory Grossly Normal] : memory grossly normal [Alert and Oriented x3] : oriented to person, place, and time [Normal Insight/Judgement] : insight and judgment were intact [de-identified] : Healed surgical scars upper back [de-identified] : Somewhat flat affect

## 2023-05-19 NOTE — HEALTH RISK ASSESSMENT
[Never] : Never [Yes] : Yes [2 - 3 times a week (3 pts)] : 2 - 3  times a week (3 points) [1 or 2 (0 pts)] : 1 or 2 (0 points) [Never (0 pts)] : Never (0 points) [1] : 2) Feeling down, depressed, or hopeless for several days (1) [PHQ-2 Negative - No further assessment needed] : PHQ-2 Negative - No further assessment needed [Patient reported mammogram was normal] : Patient reported mammogram was normal [Patient reported PAP Smear was normal] : Patient reported PAP Smear was normal [HIV test declined] : HIV test declined [Hepatitis C test declined] : Hepatitis C test declined [# of Members in Household ___] :  household currently consist of [unfilled] member(s) [Graduate School] : graduate school [] :  [# Of Children ___] : has [unfilled] children [Smoke Detector] : smoke detector [Safety elements used in home] : safety elements used in home [Seat Belt] :  uses seat belt [Several Days (1)] : 3.) Trouble falling asleep, or sleeping too much? Several days [Nearly Every Day (3)] : 4.) Feeling tired or having little energy? Nearly every day [1/2 of Days or More (2)] : 7.) Trouble concentrating on things, such as reading a newspaper or watching television? Half the days or more [Not at All (0)] : 9.) Thoughts that you would be off dead or of hurting yourself in some way? 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

## 2023-06-02 ENCOUNTER — APPOINTMENT (OUTPATIENT)
Dept: FAMILY MEDICINE | Facility: CLINIC | Age: 48
End: 2023-06-02
Payer: COMMERCIAL

## 2023-06-02 VITALS
BODY MASS INDEX: 28.56 KG/M2 | DIASTOLIC BLOOD PRESSURE: 84 MMHG | HEART RATE: 73 BPM | OXYGEN SATURATION: 98 % | RESPIRATION RATE: 18 BRPM | TEMPERATURE: 98.4 F | HEIGHT: 71 IN | SYSTOLIC BLOOD PRESSURE: 120 MMHG | WEIGHT: 204 LBS

## 2023-06-02 LAB — S PYO AG SPEC QL IA: NEGATIVE

## 2023-06-02 PROCEDURE — 99213 OFFICE O/P EST LOW 20 MIN: CPT | Mod: 25

## 2023-06-02 PROCEDURE — 87880 STREP A ASSAY W/OPTIC: CPT | Mod: QW

## 2023-06-05 ENCOUNTER — APPOINTMENT (OUTPATIENT)
Dept: BARIATRICS/WEIGHT MGMT | Facility: CLINIC | Age: 48
End: 2023-06-05
Payer: COMMERCIAL

## 2023-06-05 VITALS — BODY MASS INDEX: 28.45 KG/M2 | WEIGHT: 204 LBS

## 2023-06-05 PROCEDURE — 99213 OFFICE O/P EST LOW 20 MIN: CPT | Mod: 95

## 2023-06-05 NOTE — ASSESSMENT
[FreeTextEntry1] : Continue to eat healthy dietary intake daily and work on water intake of 64 ounces daily \par Medication- increase to mounjaro 10 mg next month\par Encouraged to go see [facility] today and sign up for classes\par RTO in 1 month

## 2023-06-05 NOTE — HISTORY OF PRESENT ILLNESS
[Other Location: e.g. School (Enter Location, City,State)___] : at [unfilled], at the time of the visit. [Other Location: e.g. Home (Enter Location, City,State)___] : at [unfilled] [Verbal consent obtained from patient] : the patient, [unfilled] [FreeTextEntry1] : 47 y/o F here for initial medical weight management consultation, referred by Dr. Turner \par Medical Hx: Obesity, HLD, HTN, GRACE, headaches, depression\par Started struggling with weight as a child, has been able to get down to 166 lbs with vigorous exercise and dietary modification. Tried WW, Noom, Isogenix, Running, Strength training. Feels that she is struggling with losing weight with dietary modification and feels frustrated and lacking motivation. \par Highest Adult Wt: 229 lb, Lowest Adult Wt: 166-174 lbs, Goal- 175 lbs\par Food Recall: B- Isogenix protein shake, L- cucumbers, D- baked ziti (small portion), denies snacking throughout the day\par Water Intake:32 ounces daily, has a 64 ounce water bottle and trying to work on increasing daily water intake \par Exercise Regimen: denies having a formal exercise regimen, has a Pelaton and loved weight training in the past \par Sleep: 10p-6 am, feels tired in the afternoon, recent sleep study done\par Stress Level: high, feels stressed and depressed, started taking Prozac 3 months ago and has reached out to 4 therapist who didn't have availability, continuing to look for therapist \par Gyn: + infertility, required IVF, not currently planning on becoming pregnant\par Social Hx: , 1 daughter (Maya), denies smoking, + alcohol intake (1-3 glasses of wine 2-3 times per week)\par Tanita Body Comp- Wt: 229.4 lb, Fat % 47.6 %, Fat Mass- 109.2 lb, FFM- 120.2 lb, TBW 87.6 lb, 38.2%, BMR- 1709 kcal, BMI- 32\par \par 8/30/22:Lost 6 lbs, reports being down 12 lbs while taking saxenda, frustrated that insurance denied coverage, sent an appeal with response pending. Experienced good appetite suppression & portion control while taking saxenda. Denied side effects on medication. Denies exercising at present.  Tanita Body Comp: Wt: 224.4 lb, Fat 46.2%, Fat Mass 103.6 lb, FFM-120.8, TBW 87.6 lb, tbw 39%, BMR 1707 kcal, BMI 31. \par \par 2/15/23: Was able to take mounjaro 2.5 mg for 2 months and tolerated well, had lost 4 lbs. Discontinued over the holidays and has since regained weight. Continues to focus on eating healthy dietary intake overall. Walking and active at school, but struggling with plantar fascitis- receiving injections to help with pain. \par \par 2/27/23: Started taking mounjaro 2.5 mg weekly. Feels better and decreased appetite.Working on inc water intake and getting adequate dietary intake daily. Ate protein shake for breakfast. Hasn't weighed herself because is can frustrate her. Continues to struggle with plantar fascitis pain, plans on seeing ortho tomorrow\par \par 5/3/23: Lost 20 lbs. B-Apple or banana, acai bowl fruit, granola, L-isogenix shake, D- chicken with cucumbers. Drinking adequate water intake, better in the afternoon then while at school. Denies exercising- no motivation. Plans on seeing PCP next week and will have labs rechecked at that time. \par \par 6/5/23: Lost 8 lbs, Feels well on 7.5 mg weekly. Drinking more water overall. Interested in trying a Pilates studio and starting to exercise. B- greek yogurt or overnight oats, L- tomato, cucumber, salad with chicken, D- chicken.

## 2023-06-06 ENCOUNTER — NON-APPOINTMENT (OUTPATIENT)
Age: 48
End: 2023-06-06

## 2023-06-06 LAB — BACTERIA THROAT CULT: NORMAL

## 2023-07-12 ENCOUNTER — APPOINTMENT (OUTPATIENT)
Dept: BARIATRICS/WEIGHT MGMT | Facility: CLINIC | Age: 48
End: 2023-07-12
Payer: COMMERCIAL

## 2023-07-12 VITALS — WEIGHT: 199 LBS | BODY MASS INDEX: 27.75 KG/M2

## 2023-07-12 PROCEDURE — 99213 OFFICE O/P EST LOW 20 MIN: CPT | Mod: 95

## 2023-07-12 NOTE — ASSESSMENT
[FreeTextEntry1] : Continue healthy lifestyle changes\par Exercise goal- Pilates classes\par Medication- can take 7.5 plus 2.5 mg dose to make 10 mg weekly or inc to 10 mg dose if it is available at the pharmacy\par RTO in 1 month \par \par

## 2023-07-12 NOTE — HISTORY OF PRESENT ILLNESS
[Home] : at home, [unfilled] , at the time of the visit. [Other Location: e.g. Home (Enter Location, City,State)___] : at [unfilled] [Verbal consent obtained from patient] : the patient, [unfilled] [FreeTextEntry1] : 45 y/o F here for initial medical weight management consultation, referred by Dr. Turner \par Medical Hx: Obesity, HLD, HTN, GRACE, headaches, depression\par Started struggling with weight as a child, has been able to get down to 166 lbs with vigorous exercise and dietary modification. Tried WW, Noom, Isogenix, Running, Strength training. Feels that she is struggling with losing weight with dietary modification and feels frustrated and lacking motivation. \par Highest Adult Wt: 229 lb, Lowest Adult Wt: 166-174 lbs, Goal- 175 lbs\par Food Recall: B- Isogenix protein shake, L- cucumbers, D- baked ziti (small portion), denies snacking throughout the day\par Water Intake:32 ounces daily, has a 64 ounce water bottle and trying to work on increasing daily water intake \par Exercise Regimen: denies having a formal exercise regimen, has a Pelaton and loved weight training in the past \par Sleep: 10p-6 am, feels tired in the afternoon, recent sleep study done\par Stress Level: high, feels stressed and depressed, started taking Prozac 3 months ago and has reached out to 4 therapist who didn't have availability, continuing to look for therapist \par Gyn: + infertility, required IVF, not currently planning on becoming pregnant\par Social Hx: , 1 daughter (Maya), denies smoking, + alcohol intake (1-3 glasses of wine 2-3 times per week)\par Tanita Body Comp- Wt: 229.4 lb, Fat % 47.6 %, Fat Mass- 109.2 lb, FFM- 120.2 lb, TBW 87.6 lb, 38.2%, BMR- 1709 kcal, BMI- 32\par \par 8/30/22:Lost 6 lbs, reports being down 12 lbs while taking saxenda, frustrated that insurance denied coverage, sent an appeal with response pending. Experienced good appetite suppression & portion control while taking saxenda. Denied side effects on medication. Denies exercising at present.  Tanita Body Comp: Wt: 224.4 lb, Fat 46.2%, Fat Mass 103.6 lb, FFM-120.8, TBW 87.6 lb, tbw 39%, BMR 1707 kcal, BMI 31. \par \par 2/15/23: Was able to take mounjaro 2.5 mg for 2 months and tolerated well, had lost 4 lbs. Discontinued over the holidays and has since regained weight. Continues to focus on eating healthy dietary intake overall. Walking and active at school, but struggling with plantar fascitis- receiving injections to help with pain. \par \par 2/27/23: Started taking mounjaro 2.5 mg weekly. Feels better and decreased appetite.Working on inc water intake and getting adequate dietary intake daily. Ate protein shake for breakfast. Hasn't weighed herself because is can frustrate her. Continues to struggle with plantar fascitis pain, plans on seeing ortho tomorrow\par \par 5/3/23: Lost 20 lbs. B-Apple or banana, acai bowl fruit, granola, L-isogenix shake, D- chicken with cucumbers. Drinking adequate water intake, better in the afternoon then while at school. Denies exercising- no motivation. Plans on seeing PCP next week and will have labs rechecked at that time. \par \par 6/5/23: Lost 8 lbs, Feels well on 7.5 mg weekly. Drinking more water overall. Interested in trying a Pilates studio and starting to exercise. B- greek yogurt or overnight oats, L- tomato, cucumber, salad with chicken, D- chicken. \par \par 7/12/23:Lost 5 lbs. Started pilates classes.Wearing smaller clothes. Water intake is better. Feels hungrier this month and ready for a dose increase.

## 2023-08-16 ENCOUNTER — APPOINTMENT (OUTPATIENT)
Dept: BARIATRICS/WEIGHT MGMT | Facility: CLINIC | Age: 48
End: 2023-08-16
Payer: COMMERCIAL

## 2023-08-16 VITALS — WEIGHT: 196 LBS | BODY MASS INDEX: 27.44 KG/M2 | HEIGHT: 71 IN

## 2023-08-16 DIAGNOSIS — Z86.39 PERSONAL HISTORY OF OTHER ENDOCRINE, NUTRITIONAL AND METABOLIC DISEASE: ICD-10-CM

## 2023-08-16 PROCEDURE — 99213 OFFICE O/P EST LOW 20 MIN: CPT | Mod: 95

## 2023-08-16 NOTE — REASON FOR VISIT
[Follow-Up] : a follow-up visit [Home] : at home, [unfilled] , at the time of the visit. [Other Location: e.g. Home (Enter Location, City,State)___] : at [unfilled]

## 2023-08-16 NOTE — HISTORY OF PRESENT ILLNESS
[FreeTextEntry1] : 47 y/o F here for initial medical weight management consultation, referred by Dr. Turner  Medical Hx: Obesity, HLD, HTN, GRACE, headaches, depression Started struggling with weight as a child, has been able to get down to 166 lbs with vigorous exercise and dietary modification. Tried WW, Noom, Isogenix, Running, Strength training. Feels that she is struggling with losing weight with dietary modification and feels frustrated and lacking motivation.  Highest Adult Wt: 229 lb, Lowest Adult Wt: 166-174 lbs, Goal- 175 lbs Food Recall: B- Isogenix protein shake, L- cucumbers, D- baked ziti (small portion), denies snacking throughout the day Water Intake:32 ounces daily, has a 64 ounce water bottle and trying to work on increasing daily water intake  Exercise Regimen: denies having a formal exercise regimen, has a Pelaton and loved weight training in the past  Sleep: 10p-6 am, feels tired in the afternoon, recent sleep study done Stress Level: high, feels stressed and depressed, started taking Prozac 3 months ago and has reached out to 4 therapist who didn't have availability, continuing to look for therapist  Gyn: + infertility, required IVF, not currently planning on becoming pregnant Social Hx: , 1 daughter (Maya), denies smoking, + alcohol intake (1-3 glasses of wine 2-3 times per week) Tanita Body Comp- Wt: 229.4 lb, Fat % 47.6 %, Fat Mass- 109.2 lb, FFM- 120.2 lb, TBW 87.6 lb, 38.2%, BMR- 1709 kcal, BMI- 32  8/30/22:Lost 6 lbs, reports being down 12 lbs while taking saxenda, frustrated that insurance denied coverage, sent an appeal with response pending. Experienced good appetite suppression & portion control while taking saxenda. Denied side effects on medication. Denies exercising at present.  Tanita Body Comp: Wt: 224.4 lb, Fat 46.2%, Fat Mass 103.6 lb, FFM-120.8, TBW 87.6 lb, tbw 39%, BMR 1707 kcal, BMI 31.   2/15/23: Was able to take mounjaro 2.5 mg for 2 months and tolerated well, had lost 4 lbs. Discontinued over the holidays and has since regained weight. Continues to focus on eating healthy dietary intake overall. Walking and active at school, but struggling with plantar fascitis- receiving injections to help with pain.   2/27/23: Started taking mounjaro 2.5 mg weekly. Feels better and decreased appetite.Working on inc water intake and getting adequate dietary intake daily. Ate protein shake for breakfast. Hasn't weighed herself because is can frustrate her. Continues to struggle with plantar fascitis pain, plans on seeing ortho tomorrow  5/3/23: Lost 20 lbs. B-Apple or banana, acai bowl fruit, granola, L-isogenix shake, D- chicken with cucumbers. Drinking adequate water intake, better in the afternoon then while at school. Denies exercising- no motivation. Plans on seeing PCP next week and will have labs rechecked at that time.   6/5/23: Lost 8 lbs, Feels well on 7.5 mg weekly. Drinking more water overall. Interested in trying a Pilates studio and starting to exercise. B- greek yogurt or overnight oats, L- tomato, cucumber, salad with chicken, D- chicken.   7/12/23:Lost 5 lbs. Started pilates classes.Wearing smaller clothes. Water intake is better. Feels hungrier this month and ready for a dose increase.   8/16/23: Lost 3 lbs,, down 36 lbs total. Mounjaro now costs $500 + per month with coupon. Took 5 mg dose on the 8/3. Saxenda was denied. Confused about coverage with current plan and nervous to be off medication. Feels well on current medication.

## 2023-08-16 NOTE — ASSESSMENT
[FreeTextEntry1] : No longer has coverage for mounjaro. Will send an appeal for saxenda to see if we can get coverage. Discussed the option to treat obesity with Qsymia. Denies contraindications to medication including: hyperthyroidism, uncontrolled hypertension, CVD, Stroke, kidney stones, seizures, and liver disease. Educated on common side effects including: headache, dizziness, dry mouth, metallic taste, and word finding issues. Discussed titration schedule and the need to taper off with discontinuation. Titrate up monthly. Discussed the importance of using 2 forms of birth control if you are sexually active and taking this medication. Discussed the option for online savings pharmacy if needed.  RTO in 1 month from starting medication

## 2023-10-05 ENCOUNTER — TRANSCRIPTION ENCOUNTER (OUTPATIENT)
Age: 48
End: 2023-10-05

## 2023-10-20 ENCOUNTER — APPOINTMENT (OUTPATIENT)
Dept: INTERNAL MEDICINE | Facility: CLINIC | Age: 48
End: 2023-10-20
Payer: COMMERCIAL

## 2023-10-20 VITALS
RESPIRATION RATE: 16 BRPM | HEART RATE: 80 BPM | BODY MASS INDEX: 28.03 KG/M2 | OXYGEN SATURATION: 98 % | DIASTOLIC BLOOD PRESSURE: 80 MMHG | WEIGHT: 201 LBS | TEMPERATURE: 98.1 F | SYSTOLIC BLOOD PRESSURE: 140 MMHG

## 2023-10-20 LAB — S PYO AG SPEC QL IA: NEGATIVE

## 2023-10-20 PROCEDURE — 87880 STREP A ASSAY W/OPTIC: CPT | Mod: QW

## 2023-10-20 PROCEDURE — 99214 OFFICE O/P EST MOD 30 MIN: CPT | Mod: 25

## 2023-11-01 NOTE — PATIENT PROFILE OB - NS PRO RUBELLA RECEIVED Y/N
Patient presents to ED complaining of penile discharge and dysuria which started today. Denies other symptoms. MD to bedside prior to RN triage. Per MD, no urine sample required prior to treatment and discharge. Patient resting on bed, respirations even and easy at this time. No obvious distress. no...

## 2023-11-03 ENCOUNTER — NON-APPOINTMENT (OUTPATIENT)
Age: 48
End: 2023-11-03

## 2023-11-06 ENCOUNTER — APPOINTMENT (OUTPATIENT)
Dept: INTERNAL MEDICINE | Facility: CLINIC | Age: 48
End: 2023-11-06
Payer: COMMERCIAL

## 2023-11-06 VITALS
WEIGHT: 197 LBS | RESPIRATION RATE: 16 BRPM | DIASTOLIC BLOOD PRESSURE: 70 MMHG | SYSTOLIC BLOOD PRESSURE: 112 MMHG | BODY MASS INDEX: 27.58 KG/M2 | HEART RATE: 78 BPM | TEMPERATURE: 97.8 F | OXYGEN SATURATION: 98 % | HEIGHT: 71 IN

## 2023-11-06 DIAGNOSIS — J45.901 UNSPECIFIED ASTHMA WITH (ACUTE) EXACERBATION: ICD-10-CM

## 2023-11-06 DIAGNOSIS — J06.9 ACUTE UPPER RESPIRATORY INFECTION, UNSPECIFIED: ICD-10-CM

## 2023-11-06 DIAGNOSIS — L65.9 NONSCARRING HAIR LOSS, UNSPECIFIED: ICD-10-CM

## 2023-11-06 PROCEDURE — 99214 OFFICE O/P EST MOD 30 MIN: CPT | Mod: 25

## 2023-11-06 PROCEDURE — G0444 DEPRESSION SCREEN ANNUAL: CPT | Mod: 59

## 2023-11-06 RX ORDER — FLUTICASONE PROPIONATE 110 UG/1
110 AEROSOL, METERED RESPIRATORY (INHALATION) TWICE DAILY
Qty: 1 | Refills: 0 | Status: DISCONTINUED | COMMUNITY
Start: 2023-11-06 | End: 2023-11-06

## 2023-11-06 RX ORDER — DOXYCYCLINE HYCLATE 100 MG/1
100 CAPSULE ORAL
Qty: 20 | Refills: 0 | Status: COMPLETED | COMMUNITY
Start: 2023-11-06 | End: 2023-11-16

## 2023-11-07 RX ORDER — BECLOMETHASONE DIPROPIONATE HFA 40 UG/1
40 AEROSOL, METERED RESPIRATORY (INHALATION) TWICE DAILY
Qty: 1 | Refills: 1 | Status: COMPLETED | COMMUNITY
Start: 2023-11-07 | End: 2024-01-06

## 2023-11-07 RX ORDER — BUDESONIDE 90 UG/1
90 AEROSOL, POWDER RESPIRATORY (INHALATION)
Qty: 1 | Refills: 1 | Status: DISCONTINUED | COMMUNITY
Start: 2023-11-06 | End: 2023-11-07

## 2024-01-11 ENCOUNTER — NON-APPOINTMENT (OUTPATIENT)
Age: 49
End: 2024-01-11

## 2024-02-01 ENCOUNTER — APPOINTMENT (OUTPATIENT)
Dept: FAMILY MEDICINE | Facility: CLINIC | Age: 49
End: 2024-02-01
Payer: COMMERCIAL

## 2024-02-01 VITALS
WEIGHT: 198 LBS | SYSTOLIC BLOOD PRESSURE: 110 MMHG | RESPIRATION RATE: 16 BRPM | HEART RATE: 76 BPM | DIASTOLIC BLOOD PRESSURE: 64 MMHG | OXYGEN SATURATION: 97 % | TEMPERATURE: 98.5 F | HEIGHT: 71 IN | BODY MASS INDEX: 27.72 KG/M2

## 2024-02-01 DIAGNOSIS — J02.9 ACUTE PHARYNGITIS, UNSPECIFIED: ICD-10-CM

## 2024-02-01 PROCEDURE — 99213 OFFICE O/P EST LOW 20 MIN: CPT

## 2024-02-01 PROCEDURE — 87880 STREP A ASSAY W/OPTIC: CPT | Mod: QW

## 2024-02-03 LAB — BACTERIA THROAT CULT: NORMAL

## 2024-02-07 ENCOUNTER — APPOINTMENT (OUTPATIENT)
Dept: BARIATRICS/WEIGHT MGMT | Facility: CLINIC | Age: 49
End: 2024-02-07
Payer: COMMERCIAL

## 2024-02-07 PROCEDURE — 99213 OFFICE O/P EST LOW 20 MIN: CPT

## 2024-02-07 NOTE — HISTORY OF PRESENT ILLNESS
[Other Location: e.g. School (Enter Location, City,State)___] : at [unfilled], at the time of the visit. [Other Location: e.g. Home (Enter Location, City,State)___] : at [unfilled] [Verbal consent obtained from patient] : the patient, [unfilled] [FreeTextEntry1] : 47 y/o F here for initial medical weight management consultation, referred by Dr. Turner  Medical Hx: Obesity, HLD, HTN, GRACE, headaches, depression Started struggling with weight as a child, has been able to get down to 166 lbs with vigorous exercise and dietary modification. Tried WW, Noom, Isogenix, Running, Strength training. Feels that she is struggling with losing weight with dietary modification and feels frustrated and lacking motivation.  Highest Adult Wt: 229 lb, Lowest Adult Wt: 166-174 lbs, Goal- 175 lbs Food Recall: B- Isogenix protein shake, L- cucumbers, D- baked ziti (small portion), denies snacking throughout the day Water Intake:32 ounces daily, has a 64 ounce water bottle and trying to work on increasing daily water intake  Exercise Regimen: denies having a formal exercise regimen, has a Pelaton and loved weight training in the past  Sleep: 10p-6 am, feels tired in the afternoon, recent sleep study done Stress Level: high, feels stressed and depressed, started taking Prozac 3 months ago and has reached out to 4 therapist who didn't have availability, continuing to look for therapist  Gyn: + infertility, required IVF, not currently planning on becoming pregnant Social Hx: , 1 daughter (Maya), denies smoking, + alcohol intake (1-3 glasses of wine 2-3 times per week) Tanita Body Comp- Wt: 229.4 lb, Fat % 47.6 %, Fat Mass- 109.2 lb, FFM- 120.2 lb, TBW 87.6 lb, 38.2%, BMR- 1709 kcal, BMI- 32  8/30/22:Lost 6 lbs, reports being down 12 lbs while taking saxenda, frustrated that insurance denied coverage, sent an appeal with response pending. Experienced good appetite suppression & portion control while taking saxenda. Denied side effects on medication. Denies exercising at present.  Tanita Body Comp: Wt: 224.4 lb, Fat 46.2%, Fat Mass 103.6 lb, FFM-120.8, TBW 87.6 lb, tbw 39%, BMR 1707 kcal, BMI 31.   2/15/23: Was able to take mounjaro 2.5 mg for 2 months and tolerated well, had lost 4 lbs. Discontinued over the holidays and has since regained weight. Continues to focus on eating healthy dietary intake overall. Walking and active at school, but struggling with plantar fascitis- receiving injections to help with pain.   2/27/23: Started taking mounjaro 2.5 mg weekly. Feels better and decreased appetite.Working on inc water intake and getting adequate dietary intake daily. Ate protein shake for breakfast. Hasn't weighed herself because is can frustrate her. Continues to struggle with plantar fascitis pain, plans on seeing ortho tomorrow  5/3/23: Lost 20 lbs. B-Apple or banana, acai bowl fruit, granola, L-isogenix shake, D- chicken with cucumbers. Drinking adequate water intake, better in the afternoon then while at school. Denies exercising- no motivation. Plans on seeing PCP next week and will have labs rechecked at that time.   6/5/23: Lost 8 lbs, Feels well on 7.5 mg weekly. Drinking more water overall. Interested in trying a Pilates studio and starting to exercise. B- greek yogurt or overnight oats, L- tomato, cucumber, salad with chicken, D- chicken.   7/12/23:Lost 5 lbs. Started pilates classes.Wearing smaller clothes. Water intake is better. Feels hungrier this month and ready for a dose increase.   8/16/23: Lost 3 lbs, down 36 lbs total. Mounjaro now costs $500 + per month with coupon. Took 5 mg dose on the 8/3. Saxenda was denied. Confused about coverage with current plan and nervous to be off medication. Feels well on current medication.   2/7/24: Took qsymia for 1 month and then started working with NP at [facility] where she was taking compounded tirzepitide since she wanted to continue with GLP1 medicine treatment. Wants to get back to [facility] program instead. Denies side effects with qsymia. Has been eating B- protein shake, L- sandwich with turkey and cheese or greek yogurt with granola, small dinner with protein and vegetables. Water intake- inadequate. Continues to do pilates for exercise. Sleeping better

## 2024-02-07 NOTE — ASSESSMENT
[FreeTextEntry1] : Rec discontinuing compounded tizepitide and explained why I recommend against compounded medicine at this time  Restart taking qsymia 3.75 mg dose x 2 weeks then inc to 7.5 mg dose x 2-4 weeks, will see in office for BP check before titrate up any further Dietary- rec increasing water intake and dietary fiber intake  RTO in 1 month for f/u

## 2024-02-20 ENCOUNTER — RX RENEWAL (OUTPATIENT)
Age: 49
End: 2024-02-20

## 2024-02-20 PROBLEM — J02.9 ACUTE PHARYNGITIS, UNSPECIFIED ETIOLOGY: Status: ACTIVE | Noted: 2024-02-01 | Resolved: 2024-03-02

## 2024-02-20 LAB — S PYO AG SPEC QL IA: NEGATIVE

## 2024-02-20 NOTE — HISTORY OF PRESENT ILLNESS
[FreeTextEntry8] : 48-year-old  female presents with history of sore throat x 4 days. No nasal congestion, cough, fever, chills, or rash. No GI or  symptoms. Performed COVID-19 antigen test at home which was negative.

## 2024-02-20 NOTE — REVIEW OF SYSTEMS
[Fatigue] : fatigue [Sore Throat] : sore throat [Negative] : Heme/Lymph [Fever] : no fever [Chills] : no chills [Earache] : no earache [Hoarseness] : no hoarseness [Nasal Discharge] : no nasal discharge [Postnasal Drip] : no postnasal drip [Wheezing] : no wheezing [Cough] : no cough [Abdominal Pain] : no abdominal pain [Hematuria] : no hematuria [Skin Rash] : no skin rash [Swollen Glands] : no swollen glands

## 2024-02-20 NOTE — PHYSICAL EXAM
[No Acute Distress] : no acute distress [Well Nourished] : well nourished [Well Developed] : well developed [Normal Voice/Communication] : normal voice/communication [Normal Sclera/Conjunctiva] : normal sclera/conjunctiva [PERRL] : pupils equal round and reactive to light [EOMI] : extraocular movements intact [Normal Outer Ear/Nose] : the outer ears and nose were normal in appearance [Normal TMs] : both tympanic membranes were normal [No JVD] : no jugular venous distention [Supple] : supple [No Respiratory Distress] : no respiratory distress  [Clear to Auscultation] : lungs were clear to auscultation bilaterally [Normal Rate] : normal rate  [Regular Rhythm] : with a regular rhythm [Normal S1, S2] : normal S1 and S2 [No Murmur] : no murmur heard [No Carotid Bruits] : no carotid bruits [Pedal Pulses Present] : the pedal pulses are present [No Edema] : there was no peripheral edema [Soft] : abdomen soft [Non Tender] : non-tender [No HSM] : no HSM [Normal Bowel Sounds] : normal bowel sounds [Normal Axillary Nodes] : no axillary lymphadenopathy [Normal Posterior Cervical Nodes] : no posterior cervical lymphadenopathy [Normal Inguinal Nodes] : no inguinal lymphadenopathy [No Rash] : no rash [de-identified] : Posterior pharynx-1+ injection without exudate or petechiae [de-identified] : Trace bilateral anterior tender cervical lymphadenopathy [de-identified] : Trace bilateral anterior tender cervical lymphadenopathy

## 2024-02-28 ENCOUNTER — NON-APPOINTMENT (OUTPATIENT)
Age: 49
End: 2024-02-28

## 2024-02-28 ENCOUNTER — APPOINTMENT (OUTPATIENT)
Dept: CARDIOLOGY | Facility: CLINIC | Age: 49
End: 2024-02-28
Payer: COMMERCIAL

## 2024-02-28 VITALS
SYSTOLIC BLOOD PRESSURE: 104 MMHG | WEIGHT: 198 LBS | DIASTOLIC BLOOD PRESSURE: 60 MMHG | OXYGEN SATURATION: 98 % | HEART RATE: 74 BPM | BODY MASS INDEX: 27.72 KG/M2 | HEIGHT: 71 IN

## 2024-02-28 PROCEDURE — 99214 OFFICE O/P EST MOD 30 MIN: CPT | Mod: 25

## 2024-02-28 PROCEDURE — 93000 ELECTROCARDIOGRAM COMPLETE: CPT

## 2024-03-06 ENCOUNTER — RESULT REVIEW (OUTPATIENT)
Age: 49
End: 2024-03-06

## 2024-03-07 ENCOUNTER — TRANSCRIPTION ENCOUNTER (OUTPATIENT)
Age: 49
End: 2024-03-07

## 2024-03-12 ENCOUNTER — APPOINTMENT (OUTPATIENT)
Dept: BARIATRICS/WEIGHT MGMT | Facility: CLINIC | Age: 49
End: 2024-03-12
Payer: COMMERCIAL

## 2024-03-12 VITALS
WEIGHT: 204 LBS | DIASTOLIC BLOOD PRESSURE: 79 MMHG | BODY MASS INDEX: 28.45 KG/M2 | SYSTOLIC BLOOD PRESSURE: 129 MMHG | HEART RATE: 70 BPM | RESPIRATION RATE: 18 BRPM

## 2024-03-12 DIAGNOSIS — E78.5 HYPERLIPIDEMIA, UNSPECIFIED: ICD-10-CM

## 2024-03-12 DIAGNOSIS — G47.33 OBSTRUCTIVE SLEEP APNEA (ADULT) (PEDIATRIC): ICD-10-CM

## 2024-03-12 DIAGNOSIS — E66.9 OBESITY, UNSPECIFIED: ICD-10-CM

## 2024-03-12 PROCEDURE — 99215 OFFICE O/P EST HI 40 MIN: CPT

## 2024-03-12 RX ORDER — TIRZEPATIDE 5 MG/.5ML
5 INJECTION, SOLUTION SUBCUTANEOUS
Qty: 2 | Refills: 1 | Status: COMPLETED | COMMUNITY
Start: 2023-02-15 | End: 2024-03-12

## 2024-03-12 RX ORDER — SEMAGLUTIDE 1.7 MG/.75ML
1.7 INJECTION, SOLUTION SUBCUTANEOUS
Qty: 1 | Refills: 0 | Status: COMPLETED | COMMUNITY
Start: 2023-08-16 | End: 2024-03-12

## 2024-03-12 RX ORDER — TIRZEPATIDE 2.5 MG/.5ML
2.5 INJECTION, SOLUTION SUBCUTANEOUS
Qty: 4 | Refills: 2 | Status: COMPLETED | COMMUNITY
Start: 2023-07-12 | End: 2024-03-12

## 2024-03-12 RX ORDER — LIRAGLUTIDE 6 MG/ML
18 INJECTION, SOLUTION SUBCUTANEOUS
Qty: 1 | Refills: 1 | Status: COMPLETED | COMMUNITY
Start: 2023-07-17 | End: 2024-03-12

## 2024-03-12 RX ORDER — TIRZEPATIDE 7.5 MG/.5ML
7.5 INJECTION, SOLUTION SUBCUTANEOUS
Qty: 4 | Refills: 1 | Status: COMPLETED | COMMUNITY
Start: 2023-05-03 | End: 2024-03-12

## 2024-03-12 RX ORDER — TIRZEPATIDE 10 MG/.5ML
10 INJECTION, SOLUTION SUBCUTANEOUS
Qty: 4 | Refills: 0 | Status: COMPLETED | COMMUNITY
Start: 2023-06-05 | End: 2024-03-12

## 2024-03-12 RX ORDER — TIRZEPATIDE 2.5 MG/.5ML
2.5 INJECTION, SOLUTION SUBCUTANEOUS
Qty: 4 | Refills: 1 | Status: COMPLETED | COMMUNITY
Start: 2022-09-22 | End: 2024-03-12

## 2024-03-12 RX ORDER — TIRZEPATIDE 2.5 MG/.5ML
2.5 INJECTION, SOLUTION SUBCUTANEOUS
Qty: 4 | Refills: 1 | Status: ACTIVE | COMMUNITY
Start: 2024-03-12 | End: 1900-01-01

## 2024-03-12 NOTE — HISTORY OF PRESENT ILLNESS
[FreeTextEntry1] : 45 y/o F here for initial medical weight management consultation, referred by Dr. Turner  Medical Hx: Obesity, HLD, HTN, GRACE, headaches, depression Started struggling with weight as a child, has been able to get down to 166 lbs with vigorous exercise and dietary modification. Tried WW, Noom, Isogenix, Running, Strength training. Feels that she is struggling with losing weight with dietary modification and feels frustrated and lacking motivation.  Highest Adult Wt: 229 lb, Lowest Adult Wt: 166-174 lbs, Goal- 175 lbs Food Recall: B- Isogenix protein shake, L- cucumbers, D- baked ziti (small portion), denies snacking throughout the day Water Intake:32 ounces daily, has a 64 ounce water bottle and trying to work on increasing daily water intake  Exercise Regimen: denies having a formal exercise regimen, has a Pelaton and loved weight training in the past  Sleep: 10p-6 am, feels tired in the afternoon, recent sleep study done Stress Level: high, feels stressed and depressed, started taking Prozac 3 months ago and has reached out to 4 therapist who didn't have availability, continuing to look for therapist  Gyn: + infertility, required IVF, not currently planning on becoming pregnant Social Hx: , 1 daughter (Maya), denies smoking, + alcohol intake (1-3 glasses of wine 2-3 times per week) Tanita Body Comp- Wt: 229.4 lb, Fat % 47.6 %, Fat Mass- 109.2 lb, FFM- 120.2 lb, TBW 87.6 lb, 38.2%, BMR- 1709 kcal, BMI- 32  8/30/22:Lost 6 lbs, reports being down 12 lbs while taking saxenda, frustrated that insurance denied coverage, sent an appeal with response pending. Experienced good appetite suppression & portion control while taking saxenda. Denied side effects on medication. Denies exercising at present.  Tanita Body Comp: Wt: 224.4 lb, Fat 46.2%, Fat Mass 103.6 lb, FFM-120.8, TBW 87.6 lb, tbw 39%, BMR 1707 kcal, BMI 31.   2/15/23: Was able to take mounjaro 2.5 mg for 2 months and tolerated well, had lost 4 lbs. Discontinued over the holidays and has since regained weight. Continues to focus on eating healthy dietary intake overall. Walking and active at school, but struggling with plantar fascitis- receiving injections to help with pain.   2/27/23: Started taking mounjaro 2.5 mg weekly. Feels better and decreased appetite.Working on inc water intake and getting adequate dietary intake daily. Ate protein shake for breakfast. Hasn't weighed herself because is can frustrate her. Continues to struggle with plantar fascitis pain, plans on seeing ortho tomorrow  5/3/23: Lost 20 lbs. B-Apple or banana, acai bowl fruit, granola, L-isogenix shake, D- chicken with cucumbers. Drinking adequate water intake, better in the afternoon then while at school. Denies exercising- no motivation. Plans on seeing PCP next week and will have labs rechecked at that time.   6/5/23: Lost 8 lbs, Feels well on 7.5 mg weekly. Drinking more water overall. Interested in trying a Pilates studio and starting to exercise. B- greek yogurt or overnight oats, L- tomato, cucumber, salad with chicken, D- chicken.   7/12/23:Lost 5 lbs. Started pilates classes.Wearing smaller clothes. Water intake is better. Feels hungrier this month and ready for a dose increase.   8/16/23: Lost 3 lbs, down 36 lbs total. Mounjaro now costs $500 + per month with coupon. Took 5 mg dose on the 8/3. Saxenda was denied. Confused about coverage with current plan and nervous to be off medication. Feels well on current medication.   2/7/24: Took qsymia for 1 month and then started working with NP at [facility] where she was taking compounded tirzepitide since she wanted to continue with GLP1 medicine treatment. Wants to get back to [facility] program instead. Denies side effects with qsymia. Has been eating B- protein shake, L- sandwich with turkey and cheese or greek yogurt with granola, small dinner with protein and vegetables. Water intake- inadequate. Continues to do pilates for exercise. Sleeping better  3/12/24: Back on qsymia, tolerating 7.5 mg dose, struggling with metallic taste and dry mouth as side effect. Feels more hungry and bloated while off compounded tirzepatide. Feeling more agitated in the afternoon after work. Continues to exercise at pilates 2 x per day. Noticed an inc in snacking- recently mindlessly ate an entire bag of rice cakes

## 2024-03-12 NOTE — ASSESSMENT
[FreeTextEntry1] : Dietary- rec focusing on getting adequate protein and fiber in diet to help feel fullness and avoid snacking Inc exercise regimen to incorporate 3 x per week of vigorous activity Meds- inc dose of qsymia and sent Rx for Zepbound to see if ins covers this as she has tolerated well in the past and is gaining weight since discontinuing treatment, rec against compounding and given info on why RTO in 1 month for BP check

## 2024-03-13 ENCOUNTER — TRANSCRIPTION ENCOUNTER (OUTPATIENT)
Age: 49
End: 2024-03-13

## 2024-03-14 ENCOUNTER — TRANSCRIPTION ENCOUNTER (OUTPATIENT)
Age: 49
End: 2024-03-14

## 2024-03-14 DIAGNOSIS — R92.30 DENSE BREASTS, UNSPECIFIED: ICD-10-CM

## 2024-03-15 ENCOUNTER — TRANSCRIPTION ENCOUNTER (OUTPATIENT)
Age: 49
End: 2024-03-15

## 2024-03-19 ENCOUNTER — APPOINTMENT (OUTPATIENT)
Dept: ENDOCRINOLOGY | Facility: CLINIC | Age: 49
End: 2024-03-19

## 2024-04-09 NOTE — PHYSICAL EXAM
[Normal] : affect appropriate MEDICATIONS  (STANDING):  erythromycin   IVPB 250 milliGRAM(s) IV Intermittent every 8 hours  lactated ringers. 1000 milliLiter(s) (100 mL/Hr) IV Continuous <Continuous>  onabotulinumtoxinA Injectable 10 Unit(s) IntraMuscular once  pantoprazole  Injectable 40 milliGRAM(s) IV Push daily  piperacillin/tazobactam IVPB.. 3.375 Gram(s) IV Intermittent every 8 hours    MEDICATIONS  (PRN):  aluminum hydroxide/magnesium hydroxide/simethicone Suspension 30 milliLiter(s) Oral every 4 hours PRN Dyspepsia  HYDROmorphone  Injectable 1 milliGRAM(s) IV Push every 4 hours PRN Severe Pain (7 - 10)  ondansetron Injectable 4 milliGRAM(s) IV Push every 6 hours PRN Nausea and/or Vomiting

## 2024-04-10 ENCOUNTER — RESULT REVIEW (OUTPATIENT)
Age: 49
End: 2024-04-10

## 2024-04-10 DIAGNOSIS — I65.29 OCCLUSION AND STENOSIS OF UNSPECIFIED CAROTID ARTERY: ICD-10-CM

## 2024-04-10 RX ORDER — LABETALOL HYDROCHLORIDE 300 MG/1
300 TABLET, FILM COATED ORAL TWICE DAILY
Qty: 180 | Refills: 3 | Status: ACTIVE | COMMUNITY
Start: 2023-02-22 | End: 1900-01-01

## 2024-04-10 RX ORDER — ATORVASTATIN CALCIUM 40 MG/1
40 TABLET, FILM COATED ORAL
Qty: 30 | Refills: 5 | Status: ACTIVE | COMMUNITY
Start: 2024-04-10 | End: 1900-01-01

## 2024-04-10 RX ORDER — LABETALOL HYDROCHLORIDE 200 MG/1
200 TABLET, FILM COATED ORAL TWICE DAILY
Qty: 180 | Refills: 3 | Status: ACTIVE | COMMUNITY
Start: 2021-11-03 | End: 1900-01-01

## 2024-04-16 ENCOUNTER — TRANSCRIPTION ENCOUNTER (OUTPATIENT)
Age: 49
End: 2024-04-16

## 2024-04-16 DIAGNOSIS — R92.30 DENSE BREASTS, UNSPECIFIED: ICD-10-CM

## 2024-04-22 ENCOUNTER — RESULT REVIEW (OUTPATIENT)
Age: 49
End: 2024-04-22

## 2024-04-22 ENCOUNTER — TRANSCRIPTION ENCOUNTER (OUTPATIENT)
Age: 49
End: 2024-04-22

## 2024-04-30 ENCOUNTER — TRANSCRIPTION ENCOUNTER (OUTPATIENT)
Age: 49
End: 2024-04-30

## 2024-05-01 ENCOUNTER — TRANSCRIPTION ENCOUNTER (OUTPATIENT)
Age: 49
End: 2024-05-01

## 2024-05-16 ENCOUNTER — APPOINTMENT (OUTPATIENT)
Dept: ENDOCRINOLOGY | Facility: CLINIC | Age: 49
End: 2024-05-16
Payer: COMMERCIAL

## 2024-05-16 ENCOUNTER — APPOINTMENT (OUTPATIENT)
Dept: INTERNAL MEDICINE | Facility: CLINIC | Age: 49
End: 2024-05-16
Payer: COMMERCIAL

## 2024-05-16 VITALS
HEART RATE: 69 BPM | DIASTOLIC BLOOD PRESSURE: 80 MMHG | HEIGHT: 71 IN | WEIGHT: 215 LBS | BODY MASS INDEX: 30.1 KG/M2 | SYSTOLIC BLOOD PRESSURE: 110 MMHG | TEMPERATURE: 98 F | OXYGEN SATURATION: 98 %

## 2024-05-16 VITALS
SYSTOLIC BLOOD PRESSURE: 107 MMHG | BODY MASS INDEX: 30.1 KG/M2 | HEART RATE: 78 BPM | WEIGHT: 215 LBS | HEIGHT: 71 IN | OXYGEN SATURATION: 97 % | DIASTOLIC BLOOD PRESSURE: 73 MMHG

## 2024-05-16 DIAGNOSIS — E66.3 OVERWEIGHT: ICD-10-CM

## 2024-05-16 DIAGNOSIS — J01.00 ACUTE MAXILLARY SINUSITIS, UNSPECIFIED: ICD-10-CM

## 2024-05-16 DIAGNOSIS — Z87.09 PERSONAL HISTORY OF OTHER DISEASES OF THE RESPIRATORY SYSTEM: ICD-10-CM

## 2024-05-16 DIAGNOSIS — Z00.00 ENCOUNTER FOR GENERAL ADULT MEDICAL EXAMINATION W/OUT ABNORMAL FINDINGS: ICD-10-CM

## 2024-05-16 DIAGNOSIS — Z23 ENCOUNTER FOR IMMUNIZATION: ICD-10-CM

## 2024-05-16 DIAGNOSIS — E04.1 NONTOXIC SINGLE THYROID NODULE: ICD-10-CM

## 2024-05-16 DIAGNOSIS — Z87.898 PERSONAL HISTORY OF OTHER SPECIFIED CONDITIONS: ICD-10-CM

## 2024-05-16 DIAGNOSIS — R00.2 PALPITATIONS: ICD-10-CM

## 2024-05-16 DIAGNOSIS — I10 ESSENTIAL (PRIMARY) HYPERTENSION: ICD-10-CM

## 2024-05-16 DIAGNOSIS — J45.909 UNSPECIFIED ASTHMA, UNCOMPLICATED: ICD-10-CM

## 2024-05-16 DIAGNOSIS — F32.1 MAJOR DEPRESSIVE DISORDER, SINGLE EPISODE, MODERATE: ICD-10-CM

## 2024-05-16 DIAGNOSIS — N94.6 DYSMENORRHEA, UNSPECIFIED: ICD-10-CM

## 2024-05-16 PROCEDURE — G2211 COMPLEX E/M VISIT ADD ON: CPT

## 2024-05-16 PROCEDURE — 36415 COLL VENOUS BLD VENIPUNCTURE: CPT

## 2024-05-16 PROCEDURE — G0444 DEPRESSION SCREEN ANNUAL: CPT | Mod: 59

## 2024-05-16 PROCEDURE — 99396 PREV VISIT EST AGE 40-64: CPT

## 2024-05-16 PROCEDURE — 99204 OFFICE O/P NEW MOD 45 MIN: CPT

## 2024-05-16 PROCEDURE — 99214 OFFICE O/P EST MOD 30 MIN: CPT | Mod: 25

## 2024-05-16 RX ORDER — NAPROXEN 500 MG/1
500 TABLET ORAL
Qty: 20 | Refills: 0 | Status: DISCONTINUED | COMMUNITY
Start: 2022-10-16 | End: 2024-05-16

## 2024-05-16 RX ORDER — CYCLOBENZAPRINE HYDROCHLORIDE 10 MG/1
10 TABLET, FILM COATED ORAL
Qty: 15 | Refills: 0 | Status: DISCONTINUED | COMMUNITY
Start: 2022-10-16 | End: 2024-05-16

## 2024-05-16 RX ORDER — FLUOXETINE HYDROCHLORIDE 10 MG/1
10 CAPSULE ORAL
Qty: 30 | Refills: 0 | Status: DISCONTINUED | COMMUNITY
Start: 2022-02-12 | End: 2024-05-16

## 2024-05-16 RX ORDER — FLUOXETINE HYDROCHLORIDE 20 MG/1
20 TABLET ORAL
Qty: 90 | Refills: 1 | Status: ACTIVE | COMMUNITY
Start: 2023-05-18 | End: 1900-01-01

## 2024-05-16 RX ORDER — GUAIFENESIN SYRUP AND DEXTROMETHORPHAN 100; 10 MG/5ML; MG/5ML
100-10 SYRUP ORAL EVERY 8 HOURS
Qty: 21 | Refills: 0 | Status: DISCONTINUED | COMMUNITY
Start: 2023-10-20 | End: 2024-05-16

## 2024-05-16 RX ORDER — LORATADINE 5 MG/5 ML
10-240 SOLUTION, ORAL ORAL DAILY
Qty: 30 | Refills: 1 | Status: DISCONTINUED | COMMUNITY
Start: 2022-10-24 | End: 2024-05-16

## 2024-05-16 RX ORDER — MENTHOL/CETYLPYRD CL 3 MG
3 LOZENGE MUCOUS MEMBRANE EVERY 6 HOURS
Qty: 28 | Refills: 0 | Status: DISCONTINUED | COMMUNITY
Start: 2023-10-20 | End: 2024-05-16

## 2024-05-16 RX ORDER — PHENTERMINE AND TOPIRAMATE 3.75; 23 MG/1; MG/1
3.75-23 CAPSULE, EXTENDED RELEASE ORAL
Qty: 30 | Refills: 0 | Status: DISCONTINUED | COMMUNITY
Start: 2023-08-16 | End: 2024-05-16

## 2024-05-16 RX ORDER — PHENTERMINE AND TOPIRAMATE 11.25; 69 MG/1; MG/1
11.25-69 CAPSULE, EXTENDED RELEASE ORAL DAILY
Qty: 30 | Refills: 0 | Status: DISCONTINUED | COMMUNITY
Start: 2024-03-12 | End: 2024-05-16

## 2024-05-16 NOTE — HISTORY OF PRESENT ILLNESS
[FreeTextEntry1] : Prior h/o thyroid disorders- Right thyroid lobe nodule discovered incidentally during carotid ultrasound in April 2024 Thyroid medications- Denies Most recent TSH- 5/2023; 1.36 uIU/ml Most recent thyroid ultrasound- April 2024: 1.6 cm solid, isoechoic nodule in the midpole of the right thyroid lobe, TR3. Prior biopsies- Denies Prior h/o thyroid surgery- Denies Prior h/o PHAM treatment- Denies Recent illness- Denies Prior h/o radiation exposure to head or neck- Denies Prior h/o exposure to nuclear accident- Denies Family h/o thyroid cancer- Denies Family h/o thyroid disorder- Denies  Denies any neck or thyroid nodularity on self-examination. Denies neck tenderness. Denies dysphagia, dysphonia, and dyspnea. Denies history of prolonged cough. Denies rapid enlargement of neck mass.

## 2024-05-16 NOTE — HEALTH RISK ASSESSMENT
[Good] : ~his/her~  mood as  good [Yes] : Yes [Monthly or less (1 pt)] : Monthly or less (1 point) [1 or 2 (0 pts)] : 1 or 2 (0 points) [Never (0 pts)] : Never (0 points) [No] : In the past 12 months have you used drugs other than those required for medical reasons? No [0] : 2) Feeling down, depressed, or hopeless: Not at all (0) [PHQ-2 Negative - No further assessment needed] : PHQ-2 Negative - No further assessment needed [Never] : Never [Patient reported mammogram was normal] : Patient reported mammogram was normal [None] : None [With Family] : lives with family [Employed] : employed [] :  [# Of Children ___] : has [unfilled] children [Sexually Active] : sexually active [Feels Safe at Home] : Feels safe at home [Audit-CScore] : 1 [PCK1Bltdb] : 0 [Change in mental status noted] : No change in mental status noted [High Risk Behavior] : no high risk behavior [MammogramDate] : 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

## 2024-05-16 NOTE — HISTORY OF PRESENT ILLNESS
[FreeTextEntry1] : Pt is here for annual PE and to go over chronic medical conditions. Patient is going to the weight loss clinic and just started Wegovy 2 days ago.  She feels okay.

## 2024-05-16 NOTE — PHYSICAL EXAM
[Alert] : alert [Well Nourished] : well nourished [Healthy Appearance] : healthy appearance [No Acute Distress] : no acute distress [Well Developed] : well developed [Normal Voice/Communication] : normal voice communication [Normal Sclera/Conjunctiva] : normal sclera/conjunctiva [EOMI] : extra ocular movement intact [Normal Hearing] : hearing was normal [No Neck Mass] : no neck mass was observed [No LAD] : no lymphadenopathy [Thyroid Not Enlarged] : the thyroid was not enlarged [No Thyroid Nodules] : no palpable thyroid nodules [No Respiratory Distress] : no respiratory distress [Normal Rate and Effort] : normal respiratory rate and effort [Normal Rate] : heart rate was normal [Regular Rhythm] : with a regular rhythm [Normal Supraclavicular Nodes] : no supraclavicular lymphadenopathy [Normal Anterior Cervical Nodes] : no anterior cervical lymphadenopathy [Normal Gait] : normal gait [No Tremors] : no tremors [Oriented x3] : oriented to person, place, and time [Normal Affect] : the affect was normal [Recent Memory Normal] : recent memory was not impaired [Normal Insight/Judgement] : insight and judgment were intact [Normal Mood] : the mood was normal [Remote Memory Normal] : remote memory was not impaired

## 2024-05-16 NOTE — REASON FOR VISIT
[Initial Evaluation] : an initial evaluation [Thyroid nodule/ MNG] : thyroid nodule/ MNG [Source: ______] : History obtained from [facility]ARY[facility] [FreeTextEntry2] : Dr. Khoury

## 2024-05-17 ENCOUNTER — TRANSCRIPTION ENCOUNTER (OUTPATIENT)
Age: 49
End: 2024-05-17

## 2024-05-17 LAB
25(OH)D3 SERPL-MCNC: 25.2 NG/ML
ALBUMIN SERPL ELPH-MCNC: 4.5 G/DL
ALP BLD-CCNC: 64 U/L
ALT SERPL-CCNC: 13 U/L
ANION GAP SERPL CALC-SCNC: 12 MMOL/L
AST SERPL-CCNC: 15 U/L
BASOPHILS # BLD AUTO: 0.06 K/UL
BASOPHILS # BLD AUTO: 0.07 K/UL
BASOPHILS NFR BLD AUTO: 0.8 %
BASOPHILS NFR BLD AUTO: 1 %
BILIRUB SERPL-MCNC: 0.6 MG/DL
BUN SERPL-MCNC: 15 MG/DL
CALCIUM SERPL-MCNC: 9.5 MG/DL
CHLORIDE SERPL-SCNC: 102 MMOL/L
CHOLEST SERPL-MCNC: 249 MG/DL
CO2 SERPL-SCNC: 24 MMOL/L
CREAT SERPL-MCNC: 0.74 MG/DL
CREAT SPEC-SCNC: 90 MG/DL
EGFR: 100 ML/MIN/1.73M2
EOSINOPHIL # BLD AUTO: 0.21 K/UL
EOSINOPHIL # BLD AUTO: 0.23 K/UL
EOSINOPHIL NFR BLD AUTO: 2.9 %
EOSINOPHIL NFR BLD AUTO: 3.2 %
ESTIMATED AVERAGE GLUCOSE: 100 MG/DL
FERRITIN SERPL-MCNC: 89 NG/ML
GLUCOSE SERPL-MCNC: 86 MG/DL
HBA1C MFR BLD HPLC: 5.1 %
HCT VFR BLD CALC: 37.6 %
HCT VFR BLD CALC: 39 %
HDLC SERPL-MCNC: 65 MG/DL
HGB BLD-MCNC: 12.6 G/DL
HGB BLD-MCNC: 12.7 G/DL
IMM GRANULOCYTES NFR BLD AUTO: 0.3 %
IMM GRANULOCYTES NFR BLD AUTO: 0.3 %
IRON SATN MFR SERPL: 26 %
IRON SERPL-MCNC: 91 UG/DL
LDLC SERPL CALC-MCNC: 170 MG/DL
LYMPHOCYTES # BLD AUTO: 2.29 K/UL
LYMPHOCYTES # BLD AUTO: 2.34 K/UL
LYMPHOCYTES NFR BLD AUTO: 31.9 %
LYMPHOCYTES NFR BLD AUTO: 32.6 %
MAN DIFF?: NORMAL
MAN DIFF?: NORMAL
MCHC RBC-ENTMCNC: 29.8 PG
MCHC RBC-ENTMCNC: 30.4 PG
MCHC RBC-ENTMCNC: 32.6 GM/DL
MCHC RBC-ENTMCNC: 33.5 GM/DL
MCV RBC AUTO: 90.8 FL
MCV RBC AUTO: 91.5 FL
MICROALBUMIN 24H UR DL<=1MG/L-MCNC: <1.2 MG/DL
MICROALBUMIN/CREAT 24H UR-RTO: NORMAL MG/G
MONOCYTES # BLD AUTO: 0.49 K/UL
MONOCYTES # BLD AUTO: 0.56 K/UL
MONOCYTES NFR BLD AUTO: 6.8 %
MONOCYTES NFR BLD AUTO: 7.8 %
NEUTROPHILS # BLD AUTO: 3.96 K/UL
NEUTROPHILS # BLD AUTO: 4.1 K/UL
NEUTROPHILS NFR BLD AUTO: 55.3 %
NEUTROPHILS NFR BLD AUTO: 57.1 %
NONHDLC SERPL-MCNC: 185 MG/DL
PLATELET # BLD AUTO: 260 K/UL
PLATELET # BLD AUTO: 274 K/UL
POTASSIUM SERPL-SCNC: 4.3 MMOL/L
PROT SERPL-MCNC: 6.8 G/DL
RBC # BLD: 4.14 M/UL
RBC # BLD: 4.26 M/UL
RBC # FLD: 12.1 %
RBC # FLD: 12.2 %
SODIUM SERPL-SCNC: 138 MMOL/L
T3FREE SERPL-MCNC: 2.61 PG/ML
T4 FREE SERPL-MCNC: 1.2 NG/DL
TIBC SERPL-MCNC: 343 UG/DL
TRIGL SERPL-MCNC: 84 MG/DL
TSH SERPL-ACNC: 2.2 UIU/ML
UIBC SERPL-MCNC: 253 UG/DL
WBC # FLD AUTO: 7.17 K/UL
WBC # FLD AUTO: 7.18 K/UL

## 2024-05-17 RX ORDER — ADHESIVE TAPE 3"X 2.3 YD
50 MCG TAPE, NON-MEDICATED TOPICAL
Refills: 0 | Status: ACTIVE | COMMUNITY
Start: 2024-05-17

## 2024-08-01 ENCOUNTER — TRANSCRIPTION ENCOUNTER (OUTPATIENT)
Age: 49
End: 2024-08-01

## 2024-08-07 ENCOUNTER — APPOINTMENT (OUTPATIENT)
Dept: BARIATRICS/WEIGHT MGMT | Facility: CLINIC | Age: 49
End: 2024-08-07

## 2024-08-07 PROCEDURE — G2211 COMPLEX E/M VISIT ADD ON: CPT | Mod: NC

## 2024-08-07 PROCEDURE — 99213 OFFICE O/P EST LOW 20 MIN: CPT

## 2024-08-07 NOTE — ASSESSMENT
[FreeTextEntry1] : Continue to focus on getting protein and fiber with each meal and adequate water daily  Continue exercise regimen of Pilates and cardio sculpt classes Med- restart saxenda treatment. Discussed common s/e and how to take medicine  RTO in 1 month for f/u

## 2024-08-07 NOTE — HISTORY OF PRESENT ILLNESS
[Other Location: e.g. School (Enter Location, City,State)___] : at [unfilled], at the time of the visit. [Other Location: e.g. Home (Enter Location, City,State)___] : at [unfilled] [Verbal consent obtained from patient] : the patient, [unfilled] [FreeTextEntry1] : 45 y/o F here for initial medical weight management consultation, referred by Dr. Turner  Medical Hx: Obesity, HLD, HTN, GRACE, headaches, depression Started struggling with weight as a child, has been able to get down to 166 lbs with vigorous exercise and dietary modification. Tried WW, Noom, Isogenix, Running, Strength training. Feels that she is struggling with losing weight with dietary modification and feels frustrated and lacking motivation.  Highest Adult Wt: 229 lb, Lowest Adult Wt: 166-174 lbs, Goal- 175 lbs Food Recall: B- Isogenix protein shake, L- cucumbers, D- baked ziti (small portion), denies snacking throughout the day Water Intake:32 ounces daily, has a 64 ounce water bottle and trying to work on increasing daily water intake  Exercise Regimen: denies having a formal exercise regimen, has a Pelaton and loved weight training in the past  Sleep: 10p-6 am, feels tired in the afternoon, recent sleep study done Stress Level: high, feels stressed and depressed, started taking Prozac 3 months ago and has reached out to 4 therapist who didn't have availability, continuing to look for therapist  Gyn: + infertility, required IVF, not currently planning on becoming pregnant Social Hx: , 1 daughter (Maya), denies smoking, + alcohol intake (1-3 glasses of wine 2-3 times per week) Tanita Body Comp- Wt: 229.4 lb, Fat % 47.6 %, Fat Mass- 109.2 lb, FFM- 120.2 lb, TBW 87.6 lb, 38.2%, BMR- 1709 kcal, BMI- 32  8/30/22:Lost 6 lbs, reports being down 12 lbs while taking saxenda, frustrated that insurance denied coverage, sent an appeal with response pending. Experienced good appetite suppression & portion control while taking saxenda. Denied side effects on medication. Denies exercising at present.  Tanita Body Comp: Wt: 224.4 lb, Fat 46.2%, Fat Mass 103.6 lb, FFM-120.8, TBW 87.6 lb, tbw 39%, BMR 1707 kcal, BMI 31.   2/15/23: Was able to take mounjaro 2.5 mg for 2 months and tolerated well, had lost 4 lbs. Discontinued over the holidays and has since regained weight. Continues to focus on eating healthy dietary intake overall. Walking and active at school, but struggling with plantar fascitis- receiving injections to help with pain.   2/27/23: Started taking mounjaro 2.5 mg weekly. Feels better and decreased appetite.Working on inc water intake and getting adequate dietary intake daily. Ate protein shake for breakfast. Hasn't weighed herself because is can frustrate her. Continues to struggle with plantar fascitis pain, plans on seeing ortho tomorrow  5/3/23: Lost 20 lbs. B-Apple or banana, acai bowl fruit, granola, L-isogenix shake, D- chicken with cucumbers. Drinking adequate water intake, better in the afternoon then while at school. Denies exercising- no motivation. Plans on seeing PCP next week and will have labs rechecked at that time.   6/5/23: Lost 8 lbs, Feels well on 7.5 mg weekly. Drinking more water overall. Interested in trying a Pilates studio and starting to exercise. B- greek yogurt or overnight oats, L- tomato, cucumber, salad with chicken, D- chicken.   7/12/23:Lost 5 lbs. Started pilates classes. Wearing smaller clothes. Water intake is better. Feels hungrier this month and ready for a dose increase.   8/16/23: Lost 3 lbs, down 36 lbs total. Mounjaro now costs $500 + per month with coupon. Took 5 mg dose on the 8/3. Saxenda was denied. Confused about coverage with current plan and nervous to be off medication. Feels well on current medication.   2/7/24: Took qsymia for 1 month and then started working with NP at [facility] where she was taking compounded tirzepitide since she wanted to continue with GLP1 medicine treatment. Wants to get back to [facility] program instead. Denies side effects with qsymia. Has been eating B- protein shake, L- sandwich with turkey and cheese or greek yogurt with granola, small dinner with protein and vegetables. Water intake- inadequate. Continues to do pilates for exercise. Sleeping better  3/12/24: Back on qsymia, tolerating 7.5 mg dose, struggling with metallic taste and dry mouth as side effect. Feels more hungry and bloated while off compounded tirzepatide. Feeling more agitated in the afternoon after work. Continues to exercise at pilates 2 x per day. Noticed an inc in snacking- recently mindlessly ate an entire bag of rice cakes  8/7/24: Tried qsymia, but felt that mood was off while on both the qsymia and prozac together. Continues to take Pilates  plus cardio sculpt. Water intake adequate. Making healthy food choices- has protein shakes.

## 2024-09-04 ENCOUNTER — APPOINTMENT (OUTPATIENT)
Dept: BARIATRICS/WEIGHT MGMT | Facility: CLINIC | Age: 49
End: 2024-09-04
Payer: COMMERCIAL

## 2024-09-04 VITALS — BODY MASS INDEX: 29.4 KG/M2 | WEIGHT: 210 LBS | HEIGHT: 71 IN

## 2024-09-04 DIAGNOSIS — I10 ESSENTIAL (PRIMARY) HYPERTENSION: ICD-10-CM

## 2024-09-04 DIAGNOSIS — G47.33 OBSTRUCTIVE SLEEP APNEA (ADULT) (PEDIATRIC): ICD-10-CM

## 2024-09-04 DIAGNOSIS — E66.9 OBESITY, UNSPECIFIED: ICD-10-CM

## 2024-09-04 DIAGNOSIS — E78.5 HYPERLIPIDEMIA, UNSPECIFIED: ICD-10-CM

## 2024-09-04 PROCEDURE — G2211 COMPLEX E/M VISIT ADD ON: CPT | Mod: NC

## 2024-09-04 PROCEDURE — 99212 OFFICE O/P EST SF 10 MIN: CPT

## 2024-09-04 NOTE — ASSESSMENT
[FreeTextEntry1] : - Summary : Continue Saxenda at the current 1.2 mg dose until appetite increases, then consider increasing to 1.8 mg. Encourage regular exercise and stress management techniques for better sleep. - Plan : - Continue Saxenda 1.2 mg daily, can change dosing time to evenings if preferred. - Increase dose to 1.8 mg if appetite increases. - Incorporate regular exercise, such as walking or Pilates classes. - Try stress reduction techniques like deep breathing or meditation to improve sleep quality. - Follow up in 4 weeks.

## 2024-09-04 NOTE — HISTORY OF PRESENT ILLNESS
[Home] : at home, [unfilled] , at the time of the visit. [Other Location: e.g. Home (Enter Location, City,State)___] : at [unfilled] [Verbal consent obtained from patient] : the patient, [unfilled] [FreeTextEntry1] : 45 y/o F here for initial medical weight management consultation, referred by Dr. Turner  Medical Hx: Obesity, HLD, HTN, GRACE, headaches, depression Started struggling with weight as a child, has been able to get down to 166 lbs with vigorous exercise and dietary modification. Tried WW, Noom, Isogenix, Running, Strength training. Feels that she is struggling with losing weight with dietary modification and feels frustrated and lacking motivation.  Highest Adult Wt: 229 lb, Lowest Adult Wt: 166-174 lbs, Goal- 175 lbs Food Recall: B- Isogenix protein shake, L- cucumbers, D- baked ziti (small portion), denies snacking throughout the day Water Intake:32 ounces daily, has a 64 ounce water bottle and trying to work on increasing daily water intake  Exercise Regimen: denies having a formal exercise regimen, has a Pelaton and loved weight training in the past  Sleep: 10p-6 am, feels tired in the afternoon, recent sleep study done Stress Level: high, feels stressed and depressed, started taking Prozac 3 months ago and has reached out to 4 therapist who didn't have availability, continuing to look for therapist  Gyn: + infertility, required IVF, not currently planning on becoming pregnant Social Hx: , 1 daughter (Maya), denies smoking, + alcohol intake (1-3 glasses of wine 2-3 times per week) Tanita Body Comp- Wt: 229.4 lb, Fat % 47.6 %, Fat Mass- 109.2 lb, FFM- 120.2 lb, TBW 87.6 lb, 38.2%, BMR- 1709 kcal, BMI- 32  8/30/22:Lost 6 lbs, reports being down 12 lbs while taking saxenda, frustrated that insurance denied coverage, sent an appeal with response pending. Experienced good appetite suppression & portion control while taking saxenda. Denied side effects on medication. Denies exercising at present.  Tanita Body Comp: Wt: 224.4 lb, Fat 46.2%, Fat Mass 103.6 lb, FFM-120.8, TBW 87.6 lb, tbw 39%, BMR 1707 kcal, BMI 31.   2/15/23: Was able to take mounjaro 2.5 mg for 2 months and tolerated well, had lost 4 lbs. Discontinued over the holidays and has since regained weight. Continues to focus on eating healthy dietary intake overall. Walking and active at school, but struggling with plantar fascitis- receiving injections to help with pain.   2/27/23: Started taking mounjaro 2.5 mg weekly. Feels better and decreased appetite.Working on inc water intake and getting adequate dietary intake daily. Ate protein shake for breakfast. Hasn't weighed herself because is can frustrate her. Continues to struggle with plantar fascitis pain, plans on seeing ortho tomorrow  5/3/23: Lost 20 lbs. B-Apple or banana, acai bowl fruit, granola, L-isogenix shake, D- chicken with cucumbers. Drinking adequate water intake, better in the afternoon then while at school. Denies exercising- no motivation. Plans on seeing PCP next week and will have labs rechecked at that time.   6/5/23: Lost 8 lbs, Feels well on 7.5 mg weekly. Drinking more water overall. Interested in trying a Pilates studio and starting to exercise. B- greek yogurt or overnight oats, L- tomato, cucumber, salad with chicken, D- chicken.   7/12/23:Lost 5 lbs. Started pilates classes. Wearing smaller clothes. Water intake is better. Feels hungrier this month and ready for a dose increase.   8/16/23: Lost 3 lbs, down 36 lbs total. Mounjaro now costs $500 + per month with coupon. Took 5 mg dose on the 8/3. Saxenda was denied. Confused about coverage with current plan and nervous to be off medication. Feels well on current medication.   2/7/24: Took qsymia for 1 month and then started working with NP at [facility] where she was taking compounded tirzepitide since she wanted to continue with GLP1 medicine treatment. Wants to get back to [facility] program instead. Denies side effects with qsymia. Has been eating B- protein shake, L- sandwich with turkey and cheese or greek yogurt with granola, small dinner with protein and vegetables. Water intake- inadequate. Continues to do pilates for exercise. Sleeping better  3/12/24: Back on qsymia, tolerating 7.5 mg dose, struggling with metallic taste and dry mouth as side effect. Feels more hungry and bloated while off compounded tirzepatide. Feeling more agitated in the afternoon after work. Continues to exercise at pilates 2 x per day. Noticed an inc in snacking- recently mindlessly ate an entire bag of rice cakes  8/7/24: Tried qsymia, but felt that mood was off while on both the qsymia and prozac together. Continues to take Pilates plus cardio sculpt. Water intake adequate. Making healthy food choices- has protein shakes.   9/4/24: Has been taking saxenda x 2-3 weeks. Lost 5 lbs. Has been able to focus on herself last 2 weeks. Feels fullness faster, less appetite with increase in dose to 1.2 mg daily. Carrots and hummus between. Pilates and cardio scult.

## 2024-09-28 ENCOUNTER — NON-APPOINTMENT (OUTPATIENT)
Age: 49
End: 2024-09-28

## 2024-10-02 ENCOUNTER — APPOINTMENT (OUTPATIENT)
Dept: BARIATRICS/WEIGHT MGMT | Facility: CLINIC | Age: 49
End: 2024-10-02
Payer: COMMERCIAL

## 2024-10-02 VITALS — HEIGHT: 71 IN | WEIGHT: 209 LBS | BODY MASS INDEX: 29.26 KG/M2

## 2024-10-02 DIAGNOSIS — E66.811 OBESITY, CLASS 1: ICD-10-CM

## 2024-10-02 DIAGNOSIS — E78.5 HYPERLIPIDEMIA, UNSPECIFIED: ICD-10-CM

## 2024-10-02 PROCEDURE — 99212 OFFICE O/P EST SF 10 MIN: CPT

## 2024-10-02 NOTE — HISTORY OF PRESENT ILLNESS
[Home] : at home, [unfilled] , at the time of the visit. [Other Location: e.g. Home (Enter Location, City,State)___] : at [unfilled] [Verbal consent obtained from patient] : the patient, [unfilled] [FreeTextEntry1] : 45 y/o F here for initial medical weight management consultation, referred by Dr. Turner  Medical Hx: Obesity, HLD, HTN, GRACE, headaches, depression Started struggling with weight as a child, has been able to get down to 166 lbs with vigorous exercise and dietary modification. Tried WW, Noom, Isogenix, Running, Strength training. Feels that she is struggling with losing weight with dietary modification and feels frustrated and lacking motivation.  Highest Adult Wt: 229 lb, Lowest Adult Wt: 166-174 lbs, Goal- 175 lbs Food Recall: B- Isogenix protein shake, L- cucumbers, D- baked ziti (small portion), denies snacking throughout the day Water Intake:32 ounces daily, has a 64 ounce water bottle and trying to work on increasing daily water intake  Exercise Regimen: denies having a formal exercise regimen, has a Pelaton and loved weight training in the past  Sleep: 10p-6 am, feels tired in the afternoon, recent sleep study done Stress Level: high, feels stressed and depressed, started taking Prozac 3 months ago and has reached out to 4 therapist who didn't have availability, continuing to look for therapist  Gyn: + infertility, required IVF, not currently planning on becoming pregnant Social Hx: , 1 daughter (Maya), denies smoking, + alcohol intake (1-3 glasses of wine 2-3 times per week) Tanita Body Comp- Wt: 229.4 lb, Fat % 47.6 %, Fat Mass- 109.2 lb, FFM- 120.2 lb, TBW 87.6 lb, 38.2%, BMR- 1709 kcal, BMI- 32  8/30/22:Lost 6 lbs, reports being down 12 lbs while taking saxenda, frustrated that insurance denied coverage, sent an appeal with response pending. Experienced good appetite suppression & portion control while taking saxenda. Denied side effects on medication. Denies exercising at present.  Tanita Body Comp: Wt: 224.4 lb, Fat 46.2%, Fat Mass 103.6 lb, FFM-120.8, TBW 87.6 lb, tbw 39%, BMR 1707 kcal, BMI 31.   2/15/23: Was able to take mounjaro 2.5 mg for 2 months and tolerated well, had lost 4 lbs. Discontinued over the holidays and has since regained weight. Continues to focus on eating healthy dietary intake overall. Walking and active at school, but struggling with plantar fascitis- receiving injections to help with pain.   2/27/23: Started taking mounjaro 2.5 mg weekly. Feels better and decreased appetite.Working on inc water intake and getting adequate dietary intake daily. Ate protein shake for breakfast. Hasn't weighed herself because is can frustrate her. Continues to struggle with plantar fascitis pain, plans on seeing ortho tomorrow  5/3/23: Lost 20 lbs. B-Apple or banana, acai bowl fruit, granola, L-isogenix shake, D- chicken with cucumbers. Drinking adequate water intake, better in the afternoon then while at school. Denies exercising- no motivation. Plans on seeing PCP next week and will have labs rechecked at that time.   6/5/23: Lost 8 lbs, Feels well on 7.5 mg weekly. Drinking more water overall. Interested in trying a Pilates studio and starting to exercise. B- greek yogurt or overnight oats, L- tomato, cucumber, salad with chicken, D- chicken.   7/12/23:Lost 5 lbs. Started pilates classes. Wearing smaller clothes. Water intake is better. Feels hungrier this month and ready for a dose increase.   8/16/23: Lost 3 lbs, down 36 lbs total. Mounjaro now costs $500 + per month with coupon. Took 5 mg dose on the 8/3. Saxenda was denied. Confused about coverage with current plan and nervous to be off medication. Feels well on current medication.   2/7/24: Took qsymia for 1 month and then started working with NP at [facility] where she was taking compounded tirzepitide since she wanted to continue with GLP1 medicine treatment. Wants to get back to [facility] program instead. Denies side effects with qsymia. Has been eating B- protein shake, L- sandwich with turkey and cheese or greek yogurt with granola, small dinner with protein and vegetables. Water intake- inadequate. Continues to do pilates for exercise. Sleeping better  3/12/24: Back on qsymia, tolerating 7.5 mg dose, struggling with metallic taste and dry mouth as side effect. Feels more hungry and bloated while off compounded tirzepatide. Feeling more agitated in the afternoon after work. Continues to exercise at pilates 2 x per day. Noticed an inc in snacking- recently mindlessly ate an entire bag of rice cakes  8/7/24: Tried qsymia, but felt that mood was off while on both the qsymia and prozac together. Continues to take Pilates plus cardio sculpt. Water intake adequate. Making healthy food choices- has protein shakes.   9/4/24: Has been taking saxenda x 2-3 weeks. Lost 5 lbs. Has been able to focus on herself last 2 weeks. Feels fullness faster, less appetite with increase in dose to 1.2 mg daily. Carrots and hummus between. Pilates and cardio scult.   10/2/24: Lost 1 lb. Taking saxenda 1.8 mg, tried increasing to 2.4 mg daily, but had sulfur burps and wasn't tolerating as well. Recently had the flu and is still recovering. Hasn't been able to exercise over the last week due to recent sickness.

## 2024-10-02 NOTE — ASSESSMENT
[FreeTextEntry1] : Refocus on healthy dietary intake, water intake and increasing exercise routine when feeling better Continue with saxenda 1.8 mg daily since the higher dose caused sulfur burps, can inc when feeling cravings - Encourage hydration and rest to aid recovery from influenza. - Gradually resume Pilates classes and stress reduction activities as the patient feels better. - Refill medication prescription as needed.

## 2024-10-03 ENCOUNTER — APPOINTMENT (OUTPATIENT)
Dept: GASTROENTEROLOGY | Facility: CLINIC | Age: 49
End: 2024-10-03
Payer: COMMERCIAL

## 2024-10-03 VITALS
DIASTOLIC BLOOD PRESSURE: 80 MMHG | SYSTOLIC BLOOD PRESSURE: 120 MMHG | HEART RATE: 80 BPM | HEIGHT: 71 IN | OXYGEN SATURATION: 98 % | BODY MASS INDEX: 29.26 KG/M2 | WEIGHT: 209 LBS

## 2024-10-03 DIAGNOSIS — Z12.11 ENCOUNTER FOR SCREENING FOR MALIGNANT NEOPLASM OF COLON: ICD-10-CM

## 2024-10-03 PROCEDURE — 99212 OFFICE O/P EST SF 10 MIN: CPT

## 2024-10-03 PROCEDURE — 99202 OFFICE O/P NEW SF 15 MIN: CPT

## 2024-10-05 NOTE — HISTORY OF PRESENT ILLNESS
[FreeTextEntry1] : Ms. NETTA CARRASCO is a 48-year-old female with a PMH of HTN, depression, obesity (on Saxenda), sleep apnea. PSH:   (), BL bunionectomy (), IVF egg retrieval - denies issues with anesthesia.   Patient presents for pre-colonoscopy evaluation. Referred to practice by PCP.  First colonoscopy. Prior CRC screening such as Cologuard, FIT test, CT colonography: No   Denies change in bowel habits, constipation, N/V/D, rectal bleeding, abdominal pain, bloating, unintentional weight loss, early satiety, dysphagia. Denies heartburn or reflux on a regular basis. Denies bloody or black stools. BM every 1-2 days; soft, brown stools that are easily passed.   Family history: Maternal uncle diagnosed with CRC age 60. Mother has had normal colonoscopies.  Denies family history of IBD or celiac disease.   Social: Smoking history: Never Alcohol consumption: 4-5 drinks per week.   Marijuana use: None Other recreational drug use: None

## 2024-10-05 NOTE — ASSESSMENT
[FreeTextEntry1] : Colonoscopy with Dr. Geiger - Indications: screening - Reviewed importance of adequate colon cleansing prior to colonoscopy. Instructed to contact office if he/she has any questions regarding prep. - Explained the risks (including but not limited to cardiopulmonary anesthetic complications, bleeding, perforation, aspiration, missed lesions and misidentified sites of lesions - complications that might necessitate hospitalization or surgery), benefits and alternatives of colonoscopy were reviewed with the patient. Preparation for the procedure was reviewed with the patient. The patient was informed that she/he would be given intravenous anesthesia by an anesthesiologist for the procedure. The patient was informed that a family member or friend must drive the patient following recovery from the procedure. Patient understands and agrees, all questions answered. - Holds: Saxenda 7 days prior to procedure. - Prep: Standard.

## 2024-10-05 NOTE — HISTORY OF PRESENT ILLNESS
[FreeTextEntry1] : Ms. NETAT CARRASCO is a 48-year-old female with a PMH of HTN, depression, obesity (on Saxenda), sleep apnea. PSH:   (), BL bunionectomy (), IVF egg retrieval - denies issues with anesthesia.   Patient presents for pre-colonoscopy evaluation. Referred to practice by PCP.  First colonoscopy. Prior CRC screening such as Cologuard, FIT test, CT colonography: No   Denies change in bowel habits, constipation, N/V/D, rectal bleeding, abdominal pain, bloating, unintentional weight loss, early satiety, dysphagia. Denies heartburn or reflux on a regular basis. Denies bloody or black stools. BM every 1-2 days; soft, brown stools that are easily passed.   Family history: Maternal uncle diagnosed with CRC age 60. Mother has had normal colonoscopies.  Denies family history of IBD or celiac disease.   Social: Smoking history: Never Alcohol consumption: 4-5 drinks per week.   Marijuana use: None Other recreational drug use: None

## 2024-10-15 ENCOUNTER — APPOINTMENT (OUTPATIENT)
Dept: OBGYN | Facility: CLINIC | Age: 49
End: 2024-10-15

## 2024-11-18 ENCOUNTER — APPOINTMENT (OUTPATIENT)
Dept: BARIATRICS/WEIGHT MGMT | Facility: CLINIC | Age: 49
End: 2024-11-18
Payer: COMMERCIAL

## 2024-11-18 VITALS — WEIGHT: 208 LBS | BODY MASS INDEX: 29.01 KG/M2

## 2024-11-18 DIAGNOSIS — G47.33 OBSTRUCTIVE SLEEP APNEA (ADULT) (PEDIATRIC): ICD-10-CM

## 2024-11-18 DIAGNOSIS — E66.811 OBESITY, CLASS 1: ICD-10-CM

## 2024-11-18 PROCEDURE — G2211 COMPLEX E/M VISIT ADD ON: CPT | Mod: NC

## 2024-11-18 PROCEDURE — 99212 OFFICE O/P EST SF 10 MIN: CPT

## 2024-12-18 ENCOUNTER — APPOINTMENT (OUTPATIENT)
Dept: BARIATRICS/WEIGHT MGMT | Facility: CLINIC | Age: 49
End: 2024-12-18

## 2025-01-10 ENCOUNTER — RESULT REVIEW (OUTPATIENT)
Age: 50
End: 2025-01-10

## 2025-01-10 ENCOUNTER — APPOINTMENT (OUTPATIENT)
Dept: GASTROENTEROLOGY | Facility: HOSPITAL | Age: 50
End: 2025-01-10

## 2025-02-03 ENCOUNTER — APPOINTMENT (OUTPATIENT)
Dept: BARIATRICS/WEIGHT MGMT | Facility: CLINIC | Age: 50
End: 2025-02-03
Payer: COMMERCIAL

## 2025-02-03 VITALS — WEIGHT: 210 LBS | BODY MASS INDEX: 29.29 KG/M2

## 2025-02-03 DIAGNOSIS — E66.811 OBESITY, CLASS 1: ICD-10-CM

## 2025-02-03 DIAGNOSIS — G47.33 OBSTRUCTIVE SLEEP APNEA (ADULT) (PEDIATRIC): ICD-10-CM

## 2025-02-03 DIAGNOSIS — E78.5 HYPERLIPIDEMIA, UNSPECIFIED: ICD-10-CM

## 2025-02-03 PROCEDURE — 99213 OFFICE O/P EST LOW 20 MIN: CPT | Mod: 95

## 2025-02-26 ENCOUNTER — APPOINTMENT (OUTPATIENT)
Dept: CARDIOLOGY | Facility: CLINIC | Age: 50
End: 2025-02-26
Payer: COMMERCIAL

## 2025-02-26 ENCOUNTER — NON-APPOINTMENT (OUTPATIENT)
Age: 50
End: 2025-02-26

## 2025-02-26 VITALS
HEIGHT: 60 IN | SYSTOLIC BLOOD PRESSURE: 114 MMHG | DIASTOLIC BLOOD PRESSURE: 76 MMHG | OXYGEN SATURATION: 96 % | HEART RATE: 74 BPM | BODY MASS INDEX: 41.82 KG/M2 | WEIGHT: 213 LBS

## 2025-02-26 DIAGNOSIS — I65.29 OCCLUSION AND STENOSIS OF UNSPECIFIED CAROTID ARTERY: ICD-10-CM

## 2025-02-26 DIAGNOSIS — I10 ESSENTIAL (PRIMARY) HYPERTENSION: ICD-10-CM

## 2025-02-26 DIAGNOSIS — E78.5 HYPERLIPIDEMIA, UNSPECIFIED: ICD-10-CM

## 2025-02-26 PROCEDURE — 93000 ELECTROCARDIOGRAM COMPLETE: CPT

## 2025-02-26 PROCEDURE — 99214 OFFICE O/P EST MOD 30 MIN: CPT | Mod: 25

## 2025-04-02 ENCOUNTER — APPOINTMENT (OUTPATIENT)
Dept: BARIATRICS/WEIGHT MGMT | Facility: CLINIC | Age: 50
End: 2025-04-02

## 2025-04-15 ENCOUNTER — APPOINTMENT (OUTPATIENT)
Dept: OBGYN | Facility: CLINIC | Age: 50
End: 2025-04-15
Payer: COMMERCIAL

## 2025-04-15 ENCOUNTER — NON-APPOINTMENT (OUTPATIENT)
Age: 50
End: 2025-04-15

## 2025-04-15 VITALS
DIASTOLIC BLOOD PRESSURE: 82 MMHG | WEIGHT: 216 LBS | SYSTOLIC BLOOD PRESSURE: 120 MMHG | HEIGHT: 60 IN | BODY MASS INDEX: 42.41 KG/M2

## 2025-04-15 DIAGNOSIS — R23.2 FLUSHING: ICD-10-CM

## 2025-04-15 DIAGNOSIS — R92.30 DENSE BREASTS, UNSPECIFIED: ICD-10-CM

## 2025-04-15 DIAGNOSIS — N39.3 STRESS INCONTINENCE (FEMALE) (MALE): ICD-10-CM

## 2025-04-15 DIAGNOSIS — R61 GENERALIZED HYPERHIDROSIS: ICD-10-CM

## 2025-04-15 DIAGNOSIS — N95.1 MENOPAUSAL AND FEMALE CLIMACTERIC STATES: ICD-10-CM

## 2025-04-15 DIAGNOSIS — Z01.419 ENCOUNTER FOR GYNECOLOGICAL EXAMINATION (GENERAL) (ROUTINE) W/OUT ABNORMAL FINDINGS: ICD-10-CM

## 2025-04-15 PROCEDURE — 99386 PREV VISIT NEW AGE 40-64: CPT

## 2025-04-15 PROCEDURE — 99459 PELVIC EXAMINATION: CPT | Mod: NC

## 2025-04-16 ENCOUNTER — APPOINTMENT (OUTPATIENT)
Dept: ENDOCRINOLOGY | Facility: CLINIC | Age: 50
End: 2025-04-16

## 2025-04-22 LAB
CYTOLOGY CVX/VAG DOC THIN PREP: NORMAL
HPV HIGH+LOW RISK DNA PNL CVX: NOT DETECTED

## 2025-05-10 ENCOUNTER — RX RENEWAL (OUTPATIENT)
Age: 50
End: 2025-05-10

## 2025-05-22 ENCOUNTER — APPOINTMENT (OUTPATIENT)
Dept: FAMILY MEDICINE | Facility: CLINIC | Age: 50
End: 2025-05-22
Payer: COMMERCIAL

## 2025-05-22 VITALS
HEIGHT: 71 IN | SYSTOLIC BLOOD PRESSURE: 129 MMHG | RESPIRATION RATE: 18 BRPM | DIASTOLIC BLOOD PRESSURE: 81 MMHG | OXYGEN SATURATION: 97 % | TEMPERATURE: 98 F | BODY MASS INDEX: 30.66 KG/M2 | WEIGHT: 219 LBS | HEART RATE: 72 BPM

## 2025-05-22 DIAGNOSIS — Z00.00 ENCOUNTER FOR GENERAL ADULT MEDICAL EXAMINATION W/OUT ABNORMAL FINDINGS: ICD-10-CM

## 2025-05-22 DIAGNOSIS — Z23 ENCOUNTER FOR IMMUNIZATION: ICD-10-CM

## 2025-05-22 DIAGNOSIS — J45.909 UNSPECIFIED ASTHMA, UNCOMPLICATED: ICD-10-CM

## 2025-05-22 DIAGNOSIS — J45.901 UNSPECIFIED ASTHMA WITH (ACUTE) EXACERBATION: ICD-10-CM

## 2025-05-22 DIAGNOSIS — G43.719 CHRONIC MIGRAINE W/OUT AURA, INTRACTABLE, W/OUT STATUS MIGRAINOSUS: ICD-10-CM

## 2025-05-22 PROCEDURE — 99214 OFFICE O/P EST MOD 30 MIN: CPT | Mod: 25

## 2025-05-22 PROCEDURE — 36415 COLL VENOUS BLD VENIPUNCTURE: CPT

## 2025-05-22 PROCEDURE — 99396 PREV VISIT EST AGE 40-64: CPT | Mod: 25

## 2025-05-22 PROCEDURE — 90471 IMMUNIZATION ADMIN: CPT

## 2025-05-22 PROCEDURE — 90707 MMR VACCINE SC: CPT

## 2025-05-22 RX ORDER — BECLOMETHASONE DIPROPIONATE HFA 80 UG/1
80 AEROSOL, METERED RESPIRATORY (INHALATION) TWICE DAILY
Qty: 1 | Refills: 2 | Status: ACTIVE | COMMUNITY
Start: 2025-05-22 | End: 1900-01-01

## 2025-05-22 RX ORDER — LOSARTAN POTASSIUM 25 MG/1
25 TABLET, FILM COATED ORAL
Qty: 90 | Refills: 0 | Status: ACTIVE | COMMUNITY
Start: 2025-05-22 | End: 1900-01-01

## 2025-05-22 RX ORDER — FLUOXETINE HYDROCHLORIDE 40 MG/1
40 CAPSULE ORAL
Qty: 90 | Refills: 3 | Status: ACTIVE | COMMUNITY
Start: 2025-05-22 | End: 1900-01-01

## 2025-05-25 LAB
ALBUMIN SERPL ELPH-MCNC: 4.7 G/DL
ALP BLD-CCNC: 82 U/L
ALT SERPL-CCNC: 21 U/L
ANION GAP SERPL CALC-SCNC: 12 MMOL/L
AST SERPL-CCNC: 15 U/L
BASOPHILS # BLD AUTO: 0.08 K/UL
BASOPHILS NFR BLD AUTO: 1.3 %
BILIRUB SERPL-MCNC: 0.4 MG/DL
BUN SERPL-MCNC: 14 MG/DL
CALCIUM SERPL-MCNC: 9.7 MG/DL
CHLORIDE SERPL-SCNC: 101 MMOL/L
CHOLEST SERPL-MCNC: 173 MG/DL
CO2 SERPL-SCNC: 24 MMOL/L
CREAT SERPL-MCNC: 0.73 MG/DL
EGFRCR SERPLBLD CKD-EPI 2021: 101 ML/MIN/1.73M2
EOSINOPHIL # BLD AUTO: 0.38 K/UL
EOSINOPHIL NFR BLD AUTO: 6 %
ESTIMATED AVERAGE GLUCOSE: 100 MG/DL
GLUCOSE SERPL-MCNC: 89 MG/DL
HBA1C MFR BLD HPLC: 5.1 %
HCT VFR BLD CALC: 39.1 %
HDLC SERPL-MCNC: 59 MG/DL
HGB BLD-MCNC: 12.8 G/DL
IMM GRANULOCYTES NFR BLD AUTO: 0.2 %
LDLC SERPL-MCNC: 103 MG/DL
LYMPHOCYTES # BLD AUTO: 2.43 K/UL
LYMPHOCYTES NFR BLD AUTO: 38.3 %
MAN DIFF?: NORMAL
MCHC RBC-ENTMCNC: 30.3 PG
MCHC RBC-ENTMCNC: 32.7 G/DL
MCV RBC AUTO: 92.4 FL
MONOCYTES # BLD AUTO: 0.41 K/UL
MONOCYTES NFR BLD AUTO: 6.5 %
NEUTROPHILS # BLD AUTO: 3.04 K/UL
NEUTROPHILS NFR BLD AUTO: 47.7 %
NONHDLC SERPL-MCNC: 113 MG/DL
PLATELET # BLD AUTO: 294 K/UL
POTASSIUM SERPL-SCNC: 4.4 MMOL/L
PROT SERPL-MCNC: 7 G/DL
RBC # BLD: 4.23 M/UL
RBC # FLD: 12.4 %
SODIUM SERPL-SCNC: 138 MMOL/L
TRIGL SERPL-MCNC: 53 MG/DL
WBC # FLD AUTO: 6.35 K/UL

## 2025-06-13 ENCOUNTER — RESULT REVIEW (OUTPATIENT)
Age: 50
End: 2025-06-13

## 2025-07-09 ENCOUNTER — TRANSCRIPTION ENCOUNTER (OUTPATIENT)
Age: 50
End: 2025-07-09

## 2025-07-09 ENCOUNTER — APPOINTMENT (OUTPATIENT)
Dept: BARIATRICS/WEIGHT MGMT | Facility: CLINIC | Age: 50
End: 2025-07-09
Payer: COMMERCIAL

## 2025-07-09 VITALS — BODY MASS INDEX: 29.57 KG/M2 | WEIGHT: 212 LBS

## 2025-07-09 VITALS — HEIGHT: 71 IN

## 2025-07-09 PROCEDURE — G2211 COMPLEX E/M VISIT ADD ON: CPT | Mod: NC,95

## 2025-07-09 PROCEDURE — 99214 OFFICE O/P EST MOD 30 MIN: CPT | Mod: 95

## 2025-07-09 RX ORDER — ESTRADIOL 0.03 MG/D
0.03 PATCH, EXTENDED RELEASE TRANSDERMAL
Qty: 1 | Refills: 2 | Status: ACTIVE | COMMUNITY
Start: 2025-07-09 | End: 1900-01-01

## 2025-07-09 RX ORDER — PROGESTERONE 100 MG/1
100 CAPSULE ORAL
Qty: 90 | Refills: 1 | Status: ACTIVE | COMMUNITY
Start: 2025-07-09 | End: 1900-01-01

## 2025-07-10 ENCOUNTER — TRANSCRIPTION ENCOUNTER (OUTPATIENT)
Age: 50
End: 2025-07-10

## 2025-07-11 ENCOUNTER — APPOINTMENT (OUTPATIENT)
Dept: FAMILY MEDICINE | Facility: CLINIC | Age: 50
End: 2025-07-11
Payer: COMMERCIAL

## 2025-07-11 VITALS
OXYGEN SATURATION: 98 % | RESPIRATION RATE: 18 BRPM | TEMPERATURE: 98 F | SYSTOLIC BLOOD PRESSURE: 126 MMHG | WEIGHT: 212 LBS | HEIGHT: 71 IN | BODY MASS INDEX: 29.68 KG/M2 | HEART RATE: 74 BPM | DIASTOLIC BLOOD PRESSURE: 88 MMHG

## 2025-07-11 PROBLEM — N39.46 MIXED INCONTINENCE: Status: ACTIVE | Noted: 2025-07-11

## 2025-07-11 PROCEDURE — 99214 OFFICE O/P EST MOD 30 MIN: CPT

## 2025-07-11 PROCEDURE — G2211 COMPLEX E/M VISIT ADD ON: CPT | Mod: NC

## 2025-07-11 RX ORDER — FLUTICASONE PROPIONATE AND SALMETEROL XINAFOATE 115; 21 UG/1; UG/1
115-21 AEROSOL, METERED RESPIRATORY (INHALATION)
Qty: 1 | Refills: 0 | Status: ACTIVE | COMMUNITY
Start: 2025-07-11 | End: 1900-01-01

## 2025-07-11 RX ORDER — MONTELUKAST 10 MG/1
10 TABLET, FILM COATED ORAL
Qty: 90 | Refills: 0 | Status: ACTIVE | COMMUNITY
Start: 2025-07-11 | End: 1900-01-01

## 2025-07-22 ENCOUNTER — NON-APPOINTMENT (OUTPATIENT)
Age: 50
End: 2025-07-22

## 2025-08-18 ENCOUNTER — RX RENEWAL (OUTPATIENT)
Age: 50
End: 2025-08-18

## 2025-09-02 ENCOUNTER — APPOINTMENT (OUTPATIENT)
Dept: OBGYN | Facility: CLINIC | Age: 50
End: 2025-09-02
Payer: COMMERCIAL

## 2025-09-02 DIAGNOSIS — N95.1 MENOPAUSAL AND FEMALE CLIMACTERIC STATES: ICD-10-CM

## 2025-09-02 DIAGNOSIS — R61 GENERALIZED HYPERHIDROSIS: ICD-10-CM

## 2025-09-02 PROCEDURE — 99214 OFFICE O/P EST MOD 30 MIN: CPT | Mod: 93

## 2025-09-11 ENCOUNTER — NON-APPOINTMENT (OUTPATIENT)
Age: 50
End: 2025-09-11